# Patient Record
Sex: FEMALE | Race: BLACK OR AFRICAN AMERICAN | NOT HISPANIC OR LATINO | Employment: PART TIME | URBAN - METROPOLITAN AREA
[De-identification: names, ages, dates, MRNs, and addresses within clinical notes are randomized per-mention and may not be internally consistent; named-entity substitution may affect disease eponyms.]

---

## 2023-04-24 LAB
CHLAMYDIA TRACH., AMPLIFIED: NEGATIVE
CLUE CELLS: NORMAL
N. GONORRHEA, AMPLIFIED: NEGATIVE
NUGENT SCORE: 1
TRICHOMONAS VAGINALIS: NEGATIVE
YEAST: NORMAL

## 2023-09-29 LAB
CLUE CELLS: NORMAL
NUGENT SCORE: NORMAL
YEAST: NORMAL

## 2023-09-30 LAB
CHLAMYDIA TRACH., AMPLIFIED: NEGATIVE
N. GONORRHEA, AMPLIFIED: NEGATIVE
TRICHOMONAS VAGINALIS: NEGATIVE

## 2023-10-03 ENCOUNTER — TELEPHONE (OUTPATIENT)
Dept: OBSTETRICS AND GYNECOLOGY | Facility: CLINIC | Age: 23
End: 2023-10-03
Payer: COMMERCIAL

## 2023-10-03 NOTE — TELEPHONE ENCOUNTER
Late entry for 10/2/2023  [facility] lab cancelled, not received by lab  Left message to call RN.    10/4/23  No return call from patient  Secure chat to FREDI HUTCHINSON

## 2023-12-12 ENCOUNTER — HOSPITAL ENCOUNTER (EMERGENCY)
Facility: HOSPITAL | Age: 23
Discharge: HOME | End: 2023-12-12
Payer: COMMERCIAL

## 2023-12-12 VITALS
TEMPERATURE: 97.7 F | HEIGHT: 65 IN | BODY MASS INDEX: 43.32 KG/M2 | DIASTOLIC BLOOD PRESSURE: 86 MMHG | SYSTOLIC BLOOD PRESSURE: 133 MMHG | HEART RATE: 96 BPM | RESPIRATION RATE: 16 BRPM | OXYGEN SATURATION: 98 % | WEIGHT: 260 LBS

## 2023-12-12 DIAGNOSIS — J06.9 VIRAL URI WITH COUGH: Primary | ICD-10-CM

## 2023-12-12 LAB
FLUAV RNA RESP QL NAA+PROBE: NOT DETECTED
FLUBV RNA RESP QL NAA+PROBE: NOT DETECTED
SARS-COV-2 RNA RESP QL NAA+PROBE: NOT DETECTED

## 2023-12-12 PROCEDURE — 99283 EMERGENCY DEPT VISIT LOW MDM: CPT

## 2023-12-12 PROCEDURE — 99284 EMERGENCY DEPT VISIT MOD MDM: CPT | Performed by: PHYSICIAN ASSISTANT

## 2023-12-12 PROCEDURE — 2500000001 HC RX 250 WO HCPCS SELF ADMINISTERED DRUGS (ALT 637 FOR MEDICARE OP): Performed by: PHYSICIAN ASSISTANT

## 2023-12-12 PROCEDURE — 87636 SARSCOV2 & INF A&B AMP PRB: CPT | Performed by: PHYSICIAN ASSISTANT

## 2023-12-12 RX ORDER — BENZONATATE 100 MG/1
200 CAPSULE ORAL ONCE
Status: COMPLETED | OUTPATIENT
Start: 2023-12-12 | End: 2023-12-12

## 2023-12-12 RX ORDER — IBUPROFEN 600 MG/1
600 TABLET ORAL EVERY 6 HOURS PRN
Qty: 30 TABLET | Refills: 0 | Status: SHIPPED | OUTPATIENT
Start: 2023-12-12

## 2023-12-12 RX ORDER — ACETAMINOPHEN 325 MG/1
650 TABLET ORAL EVERY 6 HOURS PRN
Qty: 30 TABLET | Refills: 0 | Status: SHIPPED | OUTPATIENT
Start: 2023-12-12

## 2023-12-12 RX ORDER — GUAIFENESIN 600 MG/1
1200 TABLET, EXTENDED RELEASE ORAL 2 TIMES DAILY PRN
Qty: 20 TABLET | Refills: 0 | Status: SHIPPED | OUTPATIENT
Start: 2023-12-12 | End: 2024-02-16 | Stop reason: WASHOUT

## 2023-12-12 RX ORDER — BENZONATATE 100 MG/1
200 CAPSULE ORAL 3 TIMES DAILY PRN
Qty: 20 CAPSULE | Refills: 0 | Status: SHIPPED | OUTPATIENT
Start: 2023-12-12 | End: 2024-02-16 | Stop reason: WASHOUT

## 2023-12-12 RX ORDER — ACETAMINOPHEN 325 MG/1
975 TABLET ORAL ONCE
Status: COMPLETED | OUTPATIENT
Start: 2023-12-12 | End: 2023-12-12

## 2023-12-12 RX ORDER — GUAIFENESIN 100 MG/5ML
400 SOLUTION ORAL ONCE
Status: COMPLETED | OUTPATIENT
Start: 2023-12-12 | End: 2023-12-12

## 2023-12-12 RX ORDER — IBUPROFEN 600 MG/1
600 TABLET ORAL ONCE
Status: COMPLETED | OUTPATIENT
Start: 2023-12-12 | End: 2023-12-12

## 2023-12-12 RX ADMIN — ACETAMINOPHEN 975 MG: 325 TABLET ORAL at 07:28

## 2023-12-12 RX ADMIN — GUAIFENESIN 400 MG: 200 SOLUTION ORAL at 07:28

## 2023-12-12 RX ADMIN — IBUPROFEN 600 MG: 600 TABLET, FILM COATED ORAL at 07:28

## 2023-12-12 RX ADMIN — BENZONATATE 200 MG: 100 CAPSULE ORAL at 07:28

## 2023-12-12 ASSESSMENT — COLUMBIA-SUICIDE SEVERITY RATING SCALE - C-SSRS
1. IN THE PAST MONTH, HAVE YOU WISHED YOU WERE DEAD OR WISHED YOU COULD GO TO SLEEP AND NOT WAKE UP?: NO
2. HAVE YOU ACTUALLY HAD ANY THOUGHTS OF KILLING YOURSELF?: NO
6. HAVE YOU EVER DONE ANYTHING, STARTED TO DO ANYTHING, OR PREPARED TO DO ANYTHING TO END YOUR LIFE?: NO

## 2023-12-12 ASSESSMENT — PAIN - FUNCTIONAL ASSESSMENT: PAIN_FUNCTIONAL_ASSESSMENT: 0-10

## 2023-12-12 ASSESSMENT — PAIN SCALES - GENERAL: PAINLEVEL_OUTOF10: 4

## 2023-12-12 NOTE — ED TRIAGE NOTES
Patient presents to the ED for flu-like symptoms. Patient is endorsing lower back pain, N/V, fevers, and chills  x1 week.  Denies any chest pain

## 2023-12-12 NOTE — DISCHARGE INSTRUCTIONS
You can see the results of your COVID-19 and influenza testing on the [facility] keila.  Take the medications as well as other over-the-counter remedies to help your symptoms.  It is important to get plenty of rest and stay well-hydrated.  If you have persistent symptoms follow-up with your primary care doctor, if you need a new 1 call the number listed below.

## 2023-12-12 NOTE — ED PROVIDER NOTES
HPI:  23-year-old female with history of asthma presents complaining of flulike symptoms.  States that for about 5 days now she has had malaise, myalgias, rhinorrhea, coryza, cough.  States she has tried some over-the-counter remedies like vapor rubs in the leg however has not had adequate relief.  She denies any chest pain or shortness of breath.  Denies any sore throat.  States she had 1 episode of nonbloody nonbilious vomiting on Thursday however has been able to eat since then.  She denies any urinary complaints.  Denies any concerns for pregnancy or STDs.      Physical Exam:   GEN: Vitals noted. NAD  EYES:  EOMs grossly intact, anicteric sclera  VERITO: Mucosa moist.  No remarkable tonsillar swelling or exudate  Face: No frontal or maxillary tenderness to percussion  NECK: Supple.  CARD: RRR  PULMONARY: Moving air well. Clear all lung fields.  ABDOMEN: Soft, no guarding, no rigidity. Nontender. NABS  EXTREMITIES: Full ROM, no pitting edema,   SKIN: Intact, warm and dry  NEURO: Alert and oriented x 3, speech is clear, no obvious deficits noted.       ----------------------------------------------------------------------------------------------------------------------------    MDM:  23-year-old female presenting for URI symptoms x 5 days.  On exam she is relatively well-appearing able to complete.  Vital signs stable.  Lungs CTABL.  No sinus tenderness given her constellation of symptoms with her physical exam high likelihood for viral URI including COVID-19 and influenza.  Given lack of hypoxia well-appearing and benign exam suspect she will have mild course.  She was given prescriptions for symptomatic relief.  Follow-up with primary care for persistent symptoms.  Work note was provided.  Return precautions reviewed.    No orders to display     Labs Reviewed   INFLUENZA A AND B PCR   SARS-COV-2 PCR, SYMPTOMATIC     Diagnoses as of 12/12/23 0716   Viral URI with cough      ----------------------------------------------------------------------------------------------------------------------------    This note was dictated using a speech recognition program.  While an attempt was made at proof reading to minimize errors, minor errors in transcription may be present call for questions.     Jamir Williamson PA-C  12/12/23 0746

## 2023-12-12 NOTE — Clinical Note
Luz Elena Morris was seen and treated in our emergency department on 12/12/2023.  She may return to work on 12/16/2023.       If you have any questions or concerns, please don't hesitate to call.      Jamir Williamson PA-C

## 2024-02-16 ENCOUNTER — OFFICE VISIT (OUTPATIENT)
Dept: PRIMARY CARE | Facility: CLINIC | Age: 24
End: 2024-02-16
Payer: COMMERCIAL

## 2024-02-16 VITALS
BODY MASS INDEX: 46.96 KG/M2 | RESPIRATION RATE: 16 BRPM | SYSTOLIC BLOOD PRESSURE: 126 MMHG | HEART RATE: 99 BPM | HEIGHT: 66 IN | DIASTOLIC BLOOD PRESSURE: 80 MMHG | OXYGEN SATURATION: 97 % | WEIGHT: 292.2 LBS

## 2024-02-16 DIAGNOSIS — Z00.00 ANNUAL PHYSICAL EXAM: Primary | ICD-10-CM

## 2024-02-16 DIAGNOSIS — M54.50 ACUTE LOW BACK PAIN, UNSPECIFIED BACK PAIN LATERALITY, UNSPECIFIED WHETHER SCIATICA PRESENT: ICD-10-CM

## 2024-02-16 DIAGNOSIS — R53.83 FATIGUE, UNSPECIFIED TYPE: ICD-10-CM

## 2024-02-16 DIAGNOSIS — H53.2 DIPLOPIA: ICD-10-CM

## 2024-02-16 DIAGNOSIS — E66.01 SEVERE OBESITY (BMI >= 40) (MULTI): ICD-10-CM

## 2024-02-16 DIAGNOSIS — J45.22 MILD INTERMITTENT ASTHMA WITH STATUS ASTHMATICUS (HHS-HCC): ICD-10-CM

## 2024-02-16 DIAGNOSIS — M79.673 PAIN OF FOOT, UNSPECIFIED LATERALITY: ICD-10-CM

## 2024-02-16 DIAGNOSIS — Z11.59 SCREENING FOR VIRAL DISEASE: ICD-10-CM

## 2024-02-16 DIAGNOSIS — K62.5 BRBPR (BRIGHT RED BLOOD PER RECTUM): ICD-10-CM

## 2024-02-16 PROCEDURE — 99213 OFFICE O/P EST LOW 20 MIN: CPT | Performed by: SPECIALIST

## 2024-02-16 PROCEDURE — 1036F TOBACCO NON-USER: CPT | Performed by: SPECIALIST

## 2024-02-16 PROCEDURE — 99385 PREV VISIT NEW AGE 18-39: CPT | Performed by: SPECIALIST

## 2024-02-16 RX ORDER — MUPIROCIN 20 MG/G
OINTMENT TOPICAL 2 TIMES DAILY
COMMUNITY

## 2024-02-16 RX ORDER — DESONIDE 0.5 MG/G
OINTMENT TOPICAL 2 TIMES DAILY
COMMUNITY
Start: 2020-08-12 | End: 2024-02-16

## 2024-02-16 RX ORDER — ALBUTEROL SULFATE 90 UG/1
AEROSOL, METERED RESPIRATORY (INHALATION)
COMMUNITY
Start: 2015-06-24

## 2024-02-16 RX ORDER — CLINDAMYCIN PHOSPHATE 10 UG/ML
LOTION TOPICAL 2 TIMES DAILY
COMMUNITY

## 2024-02-16 RX ORDER — ERYTHROMYCIN AND BENZOYL PEROXIDE 30; 50 MG/G; MG/G
GEL TOPICAL
COMMUNITY
Start: 2016-06-29 | End: 2024-02-16 | Stop reason: WASHOUT

## 2024-02-16 RX ORDER — CETIRIZINE HYDROCHLORIDE 10 MG/1
10 TABLET ORAL DAILY PRN
COMMUNITY

## 2024-02-16 RX ORDER — BENZOYL PEROXIDE 50 MG/ML
LIQUID TOPICAL EVERY 12 HOURS
COMMUNITY
Start: 2023-01-27

## 2024-02-16 RX ORDER — FLUTICASONE PROPIONATE 50 MCG
1 SPRAY, SUSPENSION (ML) NASAL DAILY
COMMUNITY
Start: 2021-08-04

## 2024-02-16 NOTE — PROGRESS NOTES
Subjective   Patient ID: Luz Elena Morris is a 23 y.o. female who presents for Establish Care, Annual Exam, and Back Pain.  HPI    22 yo female Pmhx sig for Allergic Rhinitis, Intermittent Asthma, PCOS and back pain presents as new patient for physical exam and back pain    Sees gynecology  Has not needed albuterol  Using mucpirocon for thigh acne    Got injured work at [employer] was lifting heavy and feels she pulled a sciatic nerve, went to urgent care, got stuff in mail   Back pain off/on since December, feels the pain from lumbar to upper buttock and shoots down back and side of right leg stops at knee.  The shooting pain from her back went to her leg and was a wincing pain.  Able to weight bear, pain comes/goes.  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  When she pulls a bag of 30-50 pound from an overhead rack to ground and when leans to right side to drag bag on floor  she feels pain in back and can be also in leg. Also having ankle.    Stopped OCPs for 7 months, not active now, stopped because of weighjt gain and periods are regular so stopped    Wants to see dietician.  Weight past couple of years 292 usually 220 to 253    Also yusef hair and thinks she is getting CTS, discussed, will need to address at next visit  Diplopia occasionally side by side, discussed eye exam and schedule follow-up    No Known Allergies   Current Outpatient Medications   Medication Instructions    acetaminophen (TYLENOL) 650 mg, oral, Every 6 hours PRN    albuterol 90 mcg/actuation inhaler inhalation    benzoyl peroxide 5 % external wash Every 12 hours    cetirizine (ZYRTEC) 10 mg, oral, Daily PRN    clindamycin (Cleocin T) 1 % lotion 2 times daily    fluticasone (Flonase) 50 mcg/actuation nasal spray 1 spray, nasal, Daily    ibuprofen 600 mg, oral, Every 6 hours PRN    mupirocin (Bactroban) 2 % ointment Topical, 2 times daily, apply sparingly to affected area        Review of Systems  Constitutional  Positive fatigue, no  "fevers, no chills, no unintentional weight loss,   HEENT:  Positive life long daily headaches every other days, Morning headaches, snoring, no dizziness, no blurred vision (eye exam last yr) occasional double vision side by side, no hearing loss  Cardiovascular:  No chest pain, no palpitations, Positive shortness of breath with exertion (one flight of stairs)  Respiratory:  No cough, no hemoptysis, no wheezing, No shortness of breath at rest  GI:  No dysphagia, no odynophagia, no reflux, no abdominal pain, no nausea, no vomiting, no changes in bowel habits, occasional bright red blood per rectum, no melena, no incontinence  :  No urinary frequency, no dysuria, no urine incontinence  MSK:  Fell at work not injured, back joint pain, no joint swelling  Neuro:  No tremors, no extremity weakness, no changes in sensation    Physical Exam  /80   Pulse 99   Resp 16   Ht 1.676 m (5' 6\")   Wt 133 kg (292 lb 3.2 oz)   SpO2 97%   BMI 47.16 kg/m²   General:    Well-appearing  F in no acute distress, well nourished, well hydrated  Head:  Normocephalic, atraumatic  Skin:          Warm dry,   Eyes:  Anicteric sclera, pupils equal,   Ears:        TMs intact  Oral:      Not examined due to pandemic  Neck:   Supple, no cervical/supraclavicular adenopathy, no thyromegaly or nodules appreciated on exam  Cor:      Regular rate, normal S1, S2, no murmurs appreciated, no S3, no S4   Lungs:   Clear to auscultation b/l, no wheezes, no rhonchi, no crackles, no accessory respiratory muscle use  Abd:          Soft, nontender, no guarding, no rebound, no hepatosplenomegaly appreciated   Ext:            No lower extremity edema, no palpable cords  Pulses:      Pedal pulses intact  Neuro:   CN2-12 grossly intact   MSK:                No ttp over lumbar spinous processes, no ttp at Right SI but positive ttp at left SI joint, negative bilaterl SLR test    Assessment/Plan   Problem List Items Addressed This Visit       Mild intermittent " asthma with status asthmaticus     Has prn albuterol but has not needed         Annual physical exam - Primary     Recommed annual influenza vaccine  Prior Tdap 2015  Recommend covid vaccine  Previously received Gardasil vaccines  Continue annual gynecology exams (Last gynecology exam with midwife 9/2/2023 and recently to urgent care for bv)  BMI 47  Labs ordered CBC CMP TSH Free T4 HBA1C fx lipids Hep C Antibody         Relevant Orders    Comprehensive Metabolic Panel    CBC    Lipid Panel    Hemoglobin A1C    Thyroid Stimulating Hormone    Thyroxine, Free    Severe obesity (BMI >= 40) (CMS/[facility])     Motivated to work toward weight loss  Wants to see a dietician  Weight 292# past couple of years, but used to be 220-253#           Relevant Orders    Hemoglobin A1C    Referral to Adult Sleep Medicine    Referral to [facility]    Fatigue     Labs ordered to evaluate for anemia abnormal thyroid function  Has morning headaches, and snores, suspect ROXANA  Referred to Sleep Medicine and [facility]         Relevant Orders    CBC    Thyroid Stimulating Hormone    Thyroxine, Free    Referral to Adult Sleep Medicine    Referral to [facility]    Screening for viral disease    Relevant Orders    Hepatitis C antibody    Pain of foot    Relevant Orders    Referral to Podiatry    Acute low back pain     -Physical therapy  -has been taking occasional Ibuprofen, to take on full stomacjh once daily for 7 days  -Wants letter for work to not lift more than 25# for 30 days, prepared  -To be re-evaluated after PT  -If sx worsen, consider medrol dp         Relevant Orders    Referral to Physical Therapy    BRBPR (bright red blood per rectum)     Said occasional  Ordered CBC  If anemia, consider further evaluation based on blood work results         Diplopia     Reports occasional horizontal diplopia  Neuro exam nonfocal  Recommended ophthalmology evaluation   Schedule follow-up visit to address        Will need to  schedule follow-up to discuss concerns about CTS       Karey Mora, DO

## 2024-02-27 PROBLEM — M79.673 PAIN OF FOOT: Status: ACTIVE | Noted: 2024-02-27

## 2024-02-27 PROBLEM — H53.2 DIPLOPIA: Status: ACTIVE | Noted: 2024-02-27

## 2024-02-27 PROBLEM — M54.50 ACUTE LOW BACK PAIN: Status: ACTIVE | Noted: 2024-02-27

## 2024-02-27 PROBLEM — R53.83 FATIGUE: Status: ACTIVE | Noted: 2024-02-27

## 2024-02-27 PROBLEM — K62.5 BRBPR (BRIGHT RED BLOOD PER RECTUM): Status: ACTIVE | Noted: 2024-02-27

## 2024-02-27 PROBLEM — Z11.59 SCREENING FOR VIRAL DISEASE: Status: ACTIVE | Noted: 2024-02-27

## 2024-02-27 NOTE — ASSESSMENT & PLAN NOTE
Recommed annual influenza vaccine  Prior Tdap 2015  Recommend covid vaccine  Previously received Gardasil vaccines  Continue annual gynecology exams (Last gynecology exam with midwife 9/2/2023 and recently to urgent care for bv)  BMI 47  Labs ordered CBC CMP TSH Free T4 HBA1C fx lipids Hep C Antibody

## 2024-02-27 NOTE — ASSESSMENT & PLAN NOTE
Reports occasional horizontal diplopia  Neuro exam nonfocal  Recommended ophthalmology evaluation   Schedule follow-up visit to address

## 2024-02-27 NOTE — ASSESSMENT & PLAN NOTE
Labs ordered to evaluate for anemia abnormal thyroid function  Has morning headaches, and snores, suspect ROXANA  Referred to Sleep Medicine and [facility]

## 2024-03-07 ENCOUNTER — TELEPHONE (OUTPATIENT)
Dept: PRIMARY CARE | Facility: CLINIC | Age: 24
End: 2024-03-07

## 2024-03-07 NOTE — TELEPHONE ENCOUNTER
Patient would like a letter for her to take leave from now until she after she starts PT on 3/14 she would like to return on 3/19. Since she does not work on 3/18.

## 2024-03-14 PROBLEM — M79.604 LOW BACK PAIN RADIATING TO RIGHT LOWER EXTREMITY: Status: ACTIVE | Noted: 2024-03-14

## 2024-03-14 PROBLEM — M54.50 LOW BACK PAIN RADIATING TO RIGHT LOWER EXTREMITY: Status: ACTIVE | Noted: 2024-03-14

## 2024-03-20 ENCOUNTER — DOCUMENTATION (OUTPATIENT)
Dept: PHYSICAL THERAPY | Facility: HOSPITAL | Age: 24
End: 2024-03-20
Payer: COMMERCIAL

## 2024-03-20 NOTE — PROGRESS NOTES
Physical Therapy                 Therapy Communication Note    Patient Name: Luz Elena Morris  MRN: [id number]  Today's Date: 3/20/2024     Discipline: Physical Therapy    Missed Time: No Show    Comment: Pt no showed to PT appt on 3/20/24 at 5:30 PM

## 2024-04-02 ENCOUNTER — APPOINTMENT (OUTPATIENT)
Dept: SLEEP MEDICINE | Facility: HOSPITAL | Age: 24
End: 2024-04-02
Payer: COMMERCIAL

## 2024-04-09 ENCOUNTER — OFFICE VISIT (OUTPATIENT)
Dept: SLEEP MEDICINE | Facility: HOSPITAL | Age: 24
End: 2024-04-09
Payer: COMMERCIAL

## 2024-04-09 VITALS
WEIGHT: 293 LBS | SYSTOLIC BLOOD PRESSURE: 124 MMHG | DIASTOLIC BLOOD PRESSURE: 83 MMHG | OXYGEN SATURATION: 96 % | TEMPERATURE: 98 F | BODY MASS INDEX: 49.39 KG/M2

## 2024-04-09 DIAGNOSIS — R29.818 SUSPECTED SLEEP APNEA: Primary | ICD-10-CM

## 2024-04-09 DIAGNOSIS — E66.01 SEVERE OBESITY (BMI >= 40) (MULTI): ICD-10-CM

## 2024-04-09 DIAGNOSIS — R53.83 FATIGUE, UNSPECIFIED TYPE: ICD-10-CM

## 2024-04-09 PROCEDURE — 1036F TOBACCO NON-USER: CPT | Performed by: INTERNAL MEDICINE

## 2024-04-09 PROCEDURE — 99204 OFFICE O/P NEW MOD 45 MIN: CPT | Performed by: INTERNAL MEDICINE

## 2024-04-09 PROCEDURE — 99214 OFFICE O/P EST MOD 30 MIN: CPT | Mod: GC | Performed by: INTERNAL MEDICINE

## 2024-04-09 SDOH — ECONOMIC STABILITY: FOOD INSECURITY: WITHIN THE PAST 12 MONTHS, THE FOOD YOU BOUGHT JUST DIDN'T LAST AND YOU DIDN'T HAVE MONEY TO GET MORE.: NEVER TRUE

## 2024-04-09 SDOH — ECONOMIC STABILITY: FOOD INSECURITY: WITHIN THE PAST 12 MONTHS, YOU WORRIED THAT YOUR FOOD WOULD RUN OUT BEFORE YOU GOT MONEY TO BUY MORE.: NEVER TRUE

## 2024-04-09 ASSESSMENT — PATIENT HEALTH QUESTIONNAIRE - PHQ9
SUM OF ALL RESPONSES TO PHQ9 QUESTIONS 1 & 2: 2
1. LITTLE INTEREST OR PLEASURE IN DOING THINGS: SEVERAL DAYS
10. IF YOU CHECKED OFF ANY PROBLEMS, HOW DIFFICULT HAVE THESE PROBLEMS MADE IT FOR YOU TO DO YOUR WORK, TAKE CARE OF THINGS AT HOME, OR GET ALONG WITH OTHER PEOPLE: SOMEWHAT DIFFICULT
2. FEELING DOWN, DEPRESSED OR HOPELESS: SEVERAL DAYS

## 2024-04-09 ASSESSMENT — LIFESTYLE VARIABLES
SKIP TO QUESTIONS 9-10: 0
AUDIT-C TOTAL SCORE: 2
HOW OFTEN DO YOU HAVE SIX OR MORE DRINKS ON ONE OCCASION: LESS THAN MONTHLY
HOW MANY STANDARD DRINKS CONTAINING ALCOHOL DO YOU HAVE ON A TYPICAL DAY: 1 OR 2
HOW OFTEN DO YOU HAVE A DRINK CONTAINING ALCOHOL: MONTHLY OR LESS

## 2024-04-09 ASSESSMENT — PAIN SCALES - GENERAL: PAINLEVEL: 0-NO PAIN

## 2024-04-09 NOTE — PROGRESS NOTES
Patient: Luz Elena Morris    [id number]  : 2000 -- AGE 23 y.o.    Provider: Treasure Martinez MD     Location [facility]   Service Date: 2024            [facility]  New Visit Note    HISTORY OF PRESENT ILLNESS     The patient's referring provider is: Karey Mora DO;     HISTORY OF PRESENT ILLNESS   Luz Elena Morris is a 23 y.o. female with a past medical history significant for obesity who presents to a [facility] for a comprehensive sleep medicine evaluation. She is here taking care of her own health. Thinks she had a sleep study when she was a kid but isnt' sure what happened. She can sleep for 8 hours and doesn't feel rested. She falls asleep at weird times- can be during a conversation, fell asleep in the bathroom at work. She has never fallen asleep behind the wheel. Missed her appointment lst week because she overslept. Wasn't as sleepy when she was in high school    She reports 40-60# weight gain since quitting smoking.   Reports a lot of depression, sadness. Due to weight gain    Sleep History:  Patient typically goes to bed around 8-9pm on the weekdays and on the weekends. It takes a few minutes to fall asleep.   Patient wakes up 0-1 times at night. Awakenings are due to nocturia and last a few minutes.  Patient wakes up feeling unrefreshed. Typically takes naps daily    Patient reports excessive daytime sleepiness for last few years, loud snoring,  witnessed apneic episodes by bed partner, denies waking up choking/gasping, reports dry mouth and morning headaches.     Preferred sleeping position: SLEEP POSITION: prone    Patient denies history of RLS, cataplexy, nightmares, hypnagogic or hypnopompic hallucinations or parasomnias. They do not act out their dreams.     Past Sleep History  Patient has the following sleep-related diagnoses: non  No previous PSG      ESS: 15    REVIEW OF SYSTEMS     REVIEW OF  SYSTEMS: as above      ALLERGIES AND MEDICATIONS     ALLERGIES  No Known Allergies    MEDICATIONS  Current Outpatient Medications   Medication Sig Dispense Refill    albuterol 90 mcg/actuation inhaler Inhale.      benzoyl peroxide 5 % external wash every 12 hours.      cetirizine (ZyrTEC) 10 mg tablet Take 1 tablet (10 mg) by mouth once daily as needed.      clindamycin (Cleocin T) 1 % lotion 2 times a day.      fluticasone (Flonase) 50 mcg/actuation nasal spray Administer 1 spray into affected nostril(s) once daily.      ibuprofen 600 mg tablet Take 1 tablet (600 mg) by mouth every 6 hours if needed for mild pain (1 - 3) or fever (temp greater than 38.0 C). 30 tablet 0    mupirocin (Bactroban) 2 % ointment Apply topically 2 times a day. apply sparingly to affected area      acetaminophen (TylenoL) 325 mg tablet Take 2 tablets (650 mg) by mouth every 6 hours if needed for mild pain (1 - 3) or fever (temp greater than 38.0 C). (Patient not taking: Reported on 2024) 30 tablet 0     No current facility-administered medications for this visit.         PAST HISTORY     PAST MEDICAL HISTORY  She  has a past medical history of Dysmenorrhea, unspecified (08/15/2022), Encounter for immunization (09/15/2020), Encounter for immunization (2017), Encounter for issue of repeat prescription (08/15/2022), and Encounter for screening for disorder due to exposure to contaminants.    PAST SURGICAL HISTORY:  No past surgical history on file.    FAMILY HISTORY  Family History   Problem Relation Name Age of Onset    Diabetes Mother              Stroke Mother      Other (amputation) Mother         She does have a family history of sleep disorder. - mother    SOCIAL HISTORY  She  reports that she has quit smoking. Her smoking use included pipe. She has never used smokeless tobacco. She reports current alcohol use of about 2.0 standard drinks of alcohol per week. She reports that she does not currently use drugs after  "having used the following drugs: Marijuana. She currently lives with family.    Caffeine consumption: rare  Alcohol consumption: no  Smoking: no  Marijuana: no      PHYSICAL EXAM     VITAL SIGNS: /83 (BP Location: Left arm, Patient Position: Sitting)   Temp 36.7 °C (98 °F) (Temporal)   Wt 139 kg (306 lb)   SpO2 96% Comment: RA  BMI 49.39 kg/m²      CURRENT WEIGHT:   Vitals:    04/09/24 1346   Weight: 139 kg (306 lb)     Body mass index is 49.39 kg/m².  PREVIOUS WEIGHTS:  Wt Readings from Last 3 Encounters:   04/09/24 139 kg (306 lb)   02/16/24 133 kg (292 lb 3.2 oz)   12/12/23 118 kg (260 lb)       Physical Exam:  General: Alert, oriented, conversant.  HEENT: Lateral facial profile with mild retrognathia, nasal septum midline, turbinates nonboggy, oropharynx is Mallampati class 4, tongue large with scalloping, jaw occlusion class 1, dentition ok.  Heart: RRR, no murmur  Lungs: CTAB, no wheezing  Extremities: no peripheral edema  Neurologic: Language is normal and fluent, face symmetric, tongue protrusion midline, stance and gait normal.   Psychiatric: Affect appropriate, mood ok.    RESULTS/DATA     No results found for: \"CO2\", \"IRON\", \"TRANSFERRIN\", \"IRONSAT\", \"TIBC\", \"FERRITIN\"          ASSESSMENT/PLAN     Ms. Morris is a 23 y.o. female and She was referred to the [facility] for evaluation of sleep disordered breathing.     #Suspected sleep apnea:  - high suspicion of apnea  - will order a HSAT  - we discussed sleep hygiene including regular sleep/wake times, avoidance of technology in bed, a regular bedtime routine, and avoiding daytime naps.  - Patient was advised not to drive or operate heavy machinery when sleepy.    #Obesity:  - BMI is 49 today  - discussed starting an exercise plan- she is going to try to dance (which she loves) 2 times a week  - will refer for weight management     Follow up 3 months      Thank you for involving Sleep Medicine in this patient's " care. It was a pleasure to see Luz Elena Morris today. We will plan to follow up in 3 months.    The patient was seen and discussed with attending Dr. Altamirano at the time of the encounter.  Treasure Martinez MD  Sleep Medicine Fellow      Attending Addendum:   NPV. BMI 49. ROXANA highly likely. Will proceed with HSAT via [facility]. Will also refer for bariatric assessment. She is agreeable to it. RTC 4 months. She verbalized understanding.     Drake Altamirano MD, FCCP, [facility]  Staff Physician  Division of Pulmonary, Critical Care, and Sleep Medicine  [facility]  Clinical Associate Professor of Medicine  [facility]

## 2024-04-09 NOTE — PATIENT INSTRUCTIONS
[facility] Sleep Medicine  [facility]  [address]  [facility] [address]  [phone]       NAME: Luz Elena Morris   DATE: 4/9/2024     Your Sleep Provider Today: Drake Altamirano MD  Your Primary Care Physician: No primary care provider on file.   Your Referring Provider: Karey Mora DO    DIAGNOSIS:   1. Suspected sleep apnea        2. Fatigue, unspecified type  Referral to Adult Sleep Medicine      3. Severe obesity (BMI >= 40) (CMS/[facility])  Referral to Adult Sleep Medicine          Thank you for coming to the Sleep Medicine Clinic today! Your sleep medicine provider today was: Drake Altamirano MD Below is a summary of your treatment plan, other important information, and our contact numbers:      TREATMENT PLAN     Please get your sleep study done, we will call with the results  We placed a referral to weight management    Referrals:  We are referring you to: weight management    Follow-up Appointment:  3 months      IMPORTANT INFORMATION     Call 911 for medical emergencies.  Our offices are generally open from Monday-Friday, 9 am - 5 pm.  If you need to get in touch with me, you may either call me and my team(number is below) or you can use rVue.  If a referral for a test, for CPAP, or for another specialist was made, and you have not heard about scheduling this within a week, please call scheduling at [phone]).  If you are unable to make your appointment for clinic or an overnight study, kindly call the office at least 48 hours in advance to cancel and reschedule.  If you are on CPAP, please bring your device's card or the device to each clinic appointment.   There are no supporting services by either the sleep doctors or their staff on weekends and Holidays, or after 5 PM on weekdays.   If you have been asked to come to a sleep study, make sure you bring toiletries, a comfy pillow, and any nighttime medications that you  may regularly take. Also be sure to eat dinner before you arrive. We generally do not provide meals.      PRESCRIPTIONS     We require 7 days advanced notice for prescription refills. If we do not receive the request in this time, we cannot guarantee that your medication will be refilled in time.      IMPORTANT PHONE NUMBERS     Sleep Medicine Clinic Fax: [phone]  Appointments (for Pediatric Sleep Clinic): [phone] - option 1  Appointments (for Adult Sleep Clinic): [phone] - option 2  Appointments (For Sleep Studies): [phone] - option 3  Behavioral Sleep Medicine: [phone]  Sleep Surgery: [phone]  ENT (Otolaryngology): [phone]  Headache Clinic (Neurology): [phone]  Neurology: [phone]  Psychiatry: [phone]  Pulmonary Function Testing (PFT) Center: [phone]  Pulmonary Medicine: [phone]  [facility] (DME): [phone]  [facility] (DME): [phone]  [facility] (DME): [phone]      OUR ADULT SLEEP MEDICINE TEAM   Please do not hesitate to call the office or sleep nurse with any questions between appointments:    Adult Sleep Nurses (Chanel Maria, RN and Rosalina Pathak RN):  For clinical questions and refilling prescriptions: [phone]  Email sleep diaries and other documents at: [email or web address]    Adult Sleep Medicine Secretaries:  Lurdes James (For [place]/Healy/Krise/[place]/Yeh/Gtz):   P: [phone]  F: [phone]  Gemma Champion (For [place]): P: [phone]  Fax: [phone]  Italia Gale (For Jurcevic/Blank): P: [phone]  F: [phone]  Marley Perkins (For [place]): P: [phone]  F: [phone]  Viki Arzate (For Teresa/Ghulam/Zakhary): P: [phone]  F: [phone]  Camilla Botello (For Jagdeep/Scotty): P: [phone]  F: [phone]     Adult Sleep Medicine Advanced Practice Providers:  Prakash Murray ([place], [place])  Bhavani Parmar (.  "[facility])  Magda Randle CNP ([place], [place], [place])  Tiffany Tamez CNP ([place], [place], [place])  Venus Santiago ([place], [place], [place])  Eliseo Fajardo CNP ([place], [place])        OUR SLEEP TESTING LOCATIONS     Our team will contact you to schedule your sleep study, however, you can contact us as follow:  Main Phone Line (scheduling only): [phone]), option 3  Adult and Pediatric Locations   [place] (6 years and older): [facility] - 4th floor ([address]) After hours line: [phone]  [facility] (Main campus: All ages): [facility], 6th floor. After hours line: [phone]   [place] (5 years and older; younger considered on case-by-case basis): [address], [address]. Scheduling  After hours line: [phone]   [place] (6 years and older): [address]; [address]   [place] (6 years and older): [address], [address]  After hours line: [phone]   [place] (13 years and older) in [place]: [address], 2nd floor  After hours line: [phone]  [facility] (13 year and older): [address]  After hours line: [phone]     Adult Only Locations:   [place] (18 years and older): [address] [facility] [address] [facility] [address] [facility], 2nd floor   [place] (18 years and older): [address]; 4th floor  After hours line: [phone]  [facility] (18 years and older) at [place]: [address] [facility] [facility] [address]  After hours line: [phone]          CONTACTING YOUR SLEEP MEDICINE PROVIDER     Send a message directly to your provider through \"My Chart\", which is the email service through your  Records Account: [email or web address]   Call [phone] and leave a message. One of the administrative assistants will forward the message to your sleep medicine provider through \"My Chart\" and/or email.     Your " sleep medicine provider for this visit was: Draek Altamirano MD

## 2024-04-15 ENCOUNTER — APPOINTMENT (OUTPATIENT)
Dept: SLEEP MEDICINE | Facility: HOSPITAL | Age: 24
End: 2024-04-15
Payer: COMMERCIAL

## 2024-04-16 DIAGNOSIS — G47.33 OSA (OBSTRUCTIVE SLEEP APNEA): Primary | ICD-10-CM

## 2024-05-09 ENCOUNTER — TREATMENT (OUTPATIENT)
Dept: PHYSICAL THERAPY | Facility: HOSPITAL | Age: 24
End: 2024-05-09
Payer: COMMERCIAL

## 2024-05-09 DIAGNOSIS — M54.50 LOW BACK PAIN RADIATING TO RIGHT LOWER EXTREMITY: ICD-10-CM

## 2024-05-09 DIAGNOSIS — M79.604 LOW BACK PAIN RADIATING TO RIGHT LOWER EXTREMITY: ICD-10-CM

## 2024-05-09 PROCEDURE — 97535 SELF CARE MNGMENT TRAINING: CPT | Mod: GP

## 2024-05-09 PROCEDURE — 97110 THERAPEUTIC EXERCISES: CPT | Mod: GP

## 2024-05-09 NOTE — PROGRESS NOTES
"Physical Therapy Treatment    Patient Name:Luz Elena Morris  MRN:[id number]  Today's Date:2024  Referred by: Karey Mora  Time Calculation  Start Time: 1517  Stop Time: 1600  Time Calculation (min): 43 min    Reason for Visit: LBP    Insurance:  Visit #: 2  Visits Approved: 20 visits   Authorization needed: Yes  Insurance info: [facility] [place] [place],    Therapy Diagnosis  1. Low back pain radiating to right lower extremity         Assessment:  Pt tolerated session well. A lot of time was taken for discussion this session about how pt has been since initial eval, current POC, current tx options, and extensively education (see below) on self care and management of sx. It was decided that pt will be discharged to continue with current POC with chiropractor for workman's comp. HEP was updated and focused on a lot of education for proper body mechanics, nutrition, and activity modification. All pt's questions were answered prior to end of session. Pt will seek out additional PT services if needed following current tx POC.    Plan:  Discharge and pt will continue with chiropractor, seek out additional services PRN.    Precautions:  Fall Risk: None     Subjective:  Pt states that she has been seeing a chiropractor for her workman's comp purposes that has really made her back better. She would like to continue there and is feeling better.     Pain:   Ratin/10    HEP Compliance: Yes     Objective:  Diaphragm dominant abdominal contraction in HL  Interventions:  PT Therapeutic Procedures Time Entry  Therapeutic Exercise Time Entry: 23  Self-Care/Home Mgmt Trainin    Therapeutic Exercise: 23 min   Lumbar roll out with SB with rotation x 10 x 2  TG L5 squats x 10 x 2   HL TA 5\" x 10   HL TA BKFO 5\" x 10 R/L   HL TA alt marches 5\" x 10 R/L  Deadlift with 17.5# KB onto step x 10     Self care/Home management: 20 min  Pt educated intensively on nutrition, self care, overall wellness, proper body mechanics for " lifting at gym and at work, TA activation, proper technique for lifting, updated plan of care, staying active, and HEP updated.

## 2024-05-23 ENCOUNTER — DOCUMENTATION (OUTPATIENT)
Dept: SURGERY | Facility: HOSPITAL | Age: 24
End: 2024-05-23
Payer: COMMERCIAL

## 2024-07-09 ENCOUNTER — APPOINTMENT (OUTPATIENT)
Dept: SURGERY | Facility: HOSPITAL | Age: 24
End: 2024-07-09
Payer: COMMERCIAL

## 2024-08-05 ENCOUNTER — APPOINTMENT (OUTPATIENT)
Dept: SURGERY | Facility: HOSPITAL | Age: 24
End: 2024-08-05
Payer: COMMERCIAL

## 2024-09-11 ENCOUNTER — OFFICE VISIT (OUTPATIENT)
Dept: PRIMARY CARE | Facility: CLINIC | Age: 24
End: 2024-09-11
Payer: COMMERCIAL

## 2024-09-11 VITALS
WEIGHT: 293 LBS | SYSTOLIC BLOOD PRESSURE: 126 MMHG | HEIGHT: 66 IN | BODY MASS INDEX: 47.09 KG/M2 | TEMPERATURE: 96.7 F | OXYGEN SATURATION: 98 % | HEART RATE: 96 BPM | DIASTOLIC BLOOD PRESSURE: 80 MMHG | RESPIRATION RATE: 16 BRPM

## 2024-09-11 DIAGNOSIS — Z11.3 SCREENING EXAMINATION FOR STD (SEXUALLY TRANSMITTED DISEASE): ICD-10-CM

## 2024-09-11 DIAGNOSIS — B35.1 ONYCHOMYCOSIS: ICD-10-CM

## 2024-09-11 DIAGNOSIS — M54.50 LOW BACK PAIN AT MULTIPLE SITES: Primary | ICD-10-CM

## 2024-09-11 PROCEDURE — 1036F TOBACCO NON-USER: CPT | Performed by: FAMILY MEDICINE

## 2024-09-11 PROCEDURE — 99204 OFFICE O/P NEW MOD 45 MIN: CPT | Performed by: FAMILY MEDICINE

## 2024-09-11 PROCEDURE — 3008F BODY MASS INDEX DOCD: CPT | Performed by: FAMILY MEDICINE

## 2024-09-11 PROCEDURE — 99214 OFFICE O/P EST MOD 30 MIN: CPT | Performed by: FAMILY MEDICINE

## 2024-09-11 ASSESSMENT — ENCOUNTER SYMPTOMS
CARDIOVASCULAR NEGATIVE: 1
WHEEZING: 1
EYES NEGATIVE: 1
BACK PAIN: 1
GASTROINTESTINAL NEGATIVE: 1
FATIGUE: 1

## 2024-09-11 ASSESSMENT — PAIN SCALES - GENERAL: PAINLEVEL: 4

## 2024-09-11 NOTE — LETTER
To Whom It May Concern,    Luz Elena Morris ([id number]) is under my care. I recommend that she does not lift more than 25 lbs until further evaluation is completed.       Sincerely,    Melina Noe MD

## 2024-09-11 NOTE — PROGRESS NOTES
Subjective   Patient ID: Luz Elena Morris is a 24 y.o. who presents to [facility] care, and for a back injury and health screenings.  HPI    #Back injury  She injured her back working at [employer] in December. She initially felt a slight discomfort but was able to finish her shift.  The pain is worse when she is bending over and pulling weights. The pain improves with tylenol and ibuprofen. She occasionally has shooting pain down her legs. The pain is a 2/10 when sitting and a 6/10 when straining. She did work with PT which did help with her pain, but finished with them in June.    #Weight gain  She also reports gaining over 60 lbs in the last year since she stopped smoking marijuana. She has noticed feeling more tired and has been told that she stops breathing while sleeping and snores occasionally. She is interested in gastric sleeve surgery but needs to look into it more.    #Toenail concern  Over the last year her toenails have started curving inward and are painful when she cuts them. She is nervous since her mother passed away from diabetes and had issues with her toenails as well. She would like to be tested for diabetes today.    #STD concern  At the end of August she was told by her ex-boyfriend that he had chlamydia. She was given doxycycline and took most of the doses. She would like to have a full STD panel and pregnancy test today.     #History of hidradenitis  She reports a history of painful lesions under her armpits and in between her thighs which improved with benzoyl and mupirocin.    PMH  Past Medical History:   Diagnosis Date    Dysmenorrhea, unspecified 08/15/2022    Menstrual cramps    Encounter for immunization 09/15/2020    Immunization due    Encounter for immunization 01/11/2017    Immunization due    Encounter for issue of repeat prescription 08/15/2022    Medication refill    Encounter for screening for disorder due to exposure to contaminants     Screening for lead exposure     Asthma: last  used an inhaler in 2014  Seasonal allergies      Medications  Current Outpatient Medications on File Prior to Visit   Medication Sig Dispense Refill    acetaminophen (TylenoL) 325 mg tablet Take 2 tablets (650 mg) by mouth every 6 hours if needed for mild pain (1 - 3) or fever (temp greater than 38.0 C). 30 tablet 0    albuterol 90 mcg/actuation inhaler Inhale.      benzoyl peroxide 5 % external wash every 12 hours.      cetirizine (ZyrTEC) 10 mg tablet Take 1 tablet (10 mg) by mouth once daily as needed.      clindamycin (Cleocin T) 1 % lotion 2 times a day.      fluticasone (Flonase) 50 mcg/actuation nasal spray Administer 1 spray into affected nostril(s) once daily.      ibuprofen 600 mg tablet Take 1 tablet (600 mg) by mouth every 6 hours if needed for mild pain (1 - 3) or fever (temp greater than 38.0 C). 30 tablet 0    mupirocin (Bactroban) 2 % ointment Apply topically 2 times a day. apply sparingly to affected area       No current facility-administered medications on file prior to visit.       Surgical History  N/a    Social History  -tobacco use: currently smokes cigarettes, occasionally vapes  -alcohol use: 2 times per month, 1-2 drinks each time  -drug use: history of occasional other drug use, but not currently  She is currently on probation for drug use. She is scheduled to be done with probation in November if she pays a service fee. She lives with her father and feels safe at home. She is planning on going to school after her probation is done. 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    She is not currently sexually active. She was previously sexually active with men and women. She has a history of gonorrhea, chlamydia, trichomonas, and BV. She has never been pregnant.    She generally has a poor diet. She eats a lot of fried chicken and pork, and few fruits and vegetables. Her work is strenuous.    Family History  Family History   Problem Relation Name Age of Onset     "Diabetes Mother              Stroke Mother      Other (amputation) Mother       Grandmother has ovarian cancer      Review of Systems   Constitutional:  Positive for fatigue.   HENT: Negative.     Eyes: Negative.    Respiratory:  Positive for wheezing.    Cardiovascular: Negative.    Gastrointestinal: Negative.    Musculoskeletal:  Positive for back pain.       Objective     /80   Pulse 96   Temp 35.9 °C (96.7 °F)   Resp 16   Ht 1.676 m (5' 6\")   Wt 138 kg (304 lb)   LMP 2024   SpO2 98%   BMI 49.07 kg/m²     General: well appearing, no distress  Neck: No lymphadenopathy, no thyromegaly  CV: Regular rate and rhythm, no murmur  Lungs: Clear to auscultation bilaterally  Abdomen: Soft, nontender, nondistended  Extremities: No edema noted  Psych: Appropriate mood and affect  Back: Tender along lumbar spine and paraspinous muscles bilaterally. Straight leg raise negative   Feet: Bilateral onychomycosis noted     Assessment/Plan     Luz Elena Morris is a 24 y.o. who presents to establish care, and for a back injury and health screenings.    #Back pain  -Differential includes lumbar strain, herniated disk, stress fracture.  -Given chronic nature of pain and limited improvement with physical therapy, ordered an X-ray to assess injury  -Plan to start using a lidocaine patch to alleviate pain.  -Provided letter for work to avoid lifting > 25 pounds while we are further working up injury   -Placed referral to PT to further assess activity limitations.    #Onychomycosis  -Referral to podiatry    #STD testing  -Placed order for HIV, syphilis, trichomonas, gonorrhea, chlamydia  -Placed order for urine pregnancy test      Follow-up in 2 months for follow-up, plan to address weight gain and additional concerns at that time    Regine Richter, MS-3    Patient was seen and examined by myself in conjunction with medical student. I have edited note above. Georgi Noe      "

## 2024-09-11 NOTE — LETTER
October 16, 2024     To Whom It May Concern,     Luz Elena Morris (2000) is under my care. Her last visit was on 9/11/2024. I recommend that she does not lift more than 25 lbs until further evaluation is completed.         Sincerely,     Melina Noe MD

## 2024-09-20 NOTE — PROGRESS NOTES
Subjective     Date: 9/20/2024 Time: 9:27 AM  Name: Luz Elena Morris  MRN: [id number]    This is a 24 y.o. female with morbid obesity (Body mass index is 50.31 kg/m².) who presents to clinic for consideration of bariatric surgery. she has attempted and failed multiple diet and exercise regimens for weight loss. Initial Onset of obesity was in childhood.  Their goal for surgery is to  be healthier . The patient has tried multiple diets to lose weight including  intermittent fasting, portion control, vegan, water fast, ACV . The patient was most successful with the  intermittent fasting . The most pounds lost on this diet were 25 lbs. The patient considers their dietary weakness to be portion size The patient reports a  highest weight ever of 312 pounds and lowest weight ever of 210 pounds Distribution of Obesity: is central. Current diet:  watching portion size . Compliance: General Adherence Diet Problems:  portion size.  } Dietary Details Include:Dietary Details: 1-2 servings vegetables and protein daily.  The patient exercises 3-4 times /week  30 Minutes/Day Types of Exercise : walking    Comorbidities: sleep apnea not using an appliance   Patient Active Problem List   Diagnosis    Mild intermittent asthma with status asthmaticus ([facility])    Annual physical exam    Severe obesity (BMI >= 40) ([facility])    Fatigue    Screening for viral disease    Pain of foot    Acute low back pain    BRBPR (bright red blood per rectum)    Diplopia    Low back pain radiating to right lower extremity       SLEEVE Gastric Surgery For Morbid Obesity Laparoscopic Longitudinal Gastrectomy     0 = No symptoms    PMH:   Past Medical History:   Diagnosis Date    Dysmenorrhea, unspecified 08/15/2022    Menstrual cramps    Encounter for immunization 09/15/2020    Immunization due    Encounter for immunization 01/11/2017    Immunization due    Encounter for issue of repeat prescription 08/15/2022    Medication refill    Encounter for screening for  disorder due to exposure to contaminants     Screening for lead exposure        PSH: No past surgical history on file.       FAMILY HISTORY:  Family History   Problem Relation Name Age of Onset    Diabetes Mother              Stroke Mother      Other (amputation) Mother          SOCIAL HISTORY:  Social History     Tobacco Use    Smoking status: Former     Types: Pipe    Smokeless tobacco: Never   Vaping Use    Vaping status: Never Used   Substance Use Topics    Alcohol use: Yes     Alcohol/week: 2.0 standard drinks of alcohol     Types: 1 Glasses of wine, 1 Shots of liquor per week     Comment: socially    Drug use: Not Currently     Types: Marijuana       MEDICATIONS:  Prior to Admission Medications:  Medication Documentation Review Audit       Reviewed by Tonya Lagos CMA (Medical Assistant) on 24 at 1424      Medication Order Taking? Sig Documenting Provider Last Dose Status   acetaminophen (TylenoL) 325 mg tablet [id number] Yes Take 2 tablets (650 mg) by mouth every 6 hours if needed for mild pain (1 - 3) or fever (temp greater than 38.0 C). Jamir Williamson PA-C Taking Active   albuterol 90 mcg/actuation inhaler [id number] Yes Inhale. Historical Provider, MD Taking Active   benzoyl peroxide 5 % external wash [id number] Yes every 12 hours. Historical Provider, MD Taking Active   cetirizine (ZyrTEC) 10 mg tablet [id number] Yes Take 1 tablet (10 mg) by mouth once daily as needed. Historical Provider, MD Taking Active   clindamycin (Cleocin T) 1 % lotion [id number] Yes 2 times a day. Historical Provider, MD Taking Active   fluticasone (Flonase) 50 mcg/actuation nasal spray [id number] Yes Administer 1 spray into affected nostril(s) once daily. Historical Provider, MD Taking Active   ibuprofen 600 mg tablet [id number] Yes Take 1 tablet (600 mg) by mouth every 6 hours if needed for mild pain (1 - 3) or fever (temp greater than 38.0 C). Jamir Williamson PA-C Taking Active   mupirocin (Bactroban) 2 %  ointment [id number] Yes Apply topically 2 times a day. apply sparingly to affected area Historical Provider, MD Taking Active                     ALLERGIES:  No Known Allergies    REVIEW OF SYSTEMS:  GENERAL: Negative for malaise, significant weight loss and fever  HEAD: Negative for headache, swelling.  NECK: Negative for lumps, goiter, pain and significant neck swelling  RESPIRATORY: Negative for cough, wheezing or shortness of breath.  CARDIOVASCULAR: Negative for chest pain, leg swelling or palpitations.  GI: Negative for abdominal discomfort, blood in stools or black stools or change in bowel habits  : No history of dysuria, frequency or incontinence  MUSCULOSKELETAL: Negative for joint pain or swelling, back pain or muscle pain.  SKIN: Negative for lesions, rash, and itching.  PSYCH: Negative for sleep disturbance, mood disorder and recent psychosocial stressors.  ENDOCRINE: Negative for cold or heat intolerance, polyuria, polydipsia and goiter.    Objective   PHYSICAL EXAM:  Visit Vitals  LMP 08/13/2024   OB Status Having periods   Smoking Status Former     General appearance: obese, NAD  Neuro: AOx3  Head: EOMI; no swelling or lesions of scalp or face  ENT:  no lumps or lymphadenopathy, thyroid normal to palpation; oropharynx clear, no swelling or erythema  Skin: warm, no erythema or rashes  Lungs: clear to percussion and auscultation  Heart: regular rhythm and S1, S2 normal  Abdomen: soft, non-tender, no masses, no organomegaly  Extremities: Normal exam of the extremities. No swelling or pain.  Psych: no hurried speech, no flight of ideas, normal affect    Assessment/Plan   IMPRESSION:  Luz Elena Morris is a 24 y.o. female with a BMI of Body mass index is 50.31 kg/m². with the following diagnoses and co-morbidities:     Past Medical History:   Diagnosis Date    Dysmenorrhea, unspecified 08/15/2022    Menstrual cramps    Encounter for immunization 09/15/2020    Immunization due    Encounter for immunization  01/11/2017    Immunization due    Encounter for issue of repeat prescription 08/15/2022    Medication refill    Encounter for screening for disorder due to exposure to contaminants     Screening for lead exposure       This patient does meet the criteria for a surgical weight loss procedure according to NIH guidelines.    The risks of sleeve gastrectomy, Asher-en-Y gastric bypass, and duodenal switch surgery including but not limited to bleeding, leak along staple lines, infection, dehydration, ulcers, internal hernia, DVT/PE, pneumonia, myocardial infarction, prolonged nausea/vomiting, incomplete resolution of associated medical conditions, reflux, weight regain, vitamin/mineral deficiencies, and death have been explained to the patient and Luz Elena Morris has expressed understanding and acceptance of them.     We discussed the lifestyle changes necessary to be successful following surgery.    She wants to proceed with sleeve gastrectomy.    Post sleeve gastrectomy reflux, possible need for gastric bypass or additional surgical procedures and lack of data on long-term outcomes discussed with the patient.    The patient was advised not to become pregnant within 12-18 months following bariatric surgery. She was educated on the increased risks to mother and fetus associated with pregnancy within 2 years of bariatric surgery.    The possible benefits of the above surgeries including weight loss, improvement/resolution of associated medical and mental health conditions, improved mobility, and decreased mortality have been explained the the patient and Luz Elena Morris has expressed understanding and acceptance of them.      PLAN:  The plan of treatment for Luz Elena Morris is to continue with the consultations and tests ordered today in hopes of qualifying for pre-operative clearance for bariatric surgery. This includes:    Consult Nutrition for education and 6 months of MSWL  Consult Psychology  Consult Cardiology  Consult  Pulmonology if PAP  Labs ordered  EGD  PCP for medical optimization  Consult sleep medicine - concern for ROXANA  Recommend at least 15 lbs of weight loss prior to surgery.

## 2024-09-24 ENCOUNTER — OFFICE VISIT (OUTPATIENT)
Dept: SURGERY | Facility: HOSPITAL | Age: 24
End: 2024-09-24
Payer: COMMERCIAL

## 2024-09-24 VITALS
SYSTOLIC BLOOD PRESSURE: 145 MMHG | BODY MASS INDEX: 47.09 KG/M2 | DIASTOLIC BLOOD PRESSURE: 86 MMHG | WEIGHT: 293 LBS | HEIGHT: 66 IN

## 2024-09-24 DIAGNOSIS — E66.01 MORBID OBESITY WITH BMI OF 45.0-49.9, ADULT (MULTI): Primary | ICD-10-CM

## 2024-09-24 DIAGNOSIS — Z71.3 PRE-BARIATRIC SURGERY NUTRITION EVALUATION: ICD-10-CM

## 2024-09-24 DIAGNOSIS — R29.818 SUSPECTED SLEEP APNEA: ICD-10-CM

## 2024-09-24 DIAGNOSIS — Z98.84 BARIATRIC SURGERY STATUS: ICD-10-CM

## 2024-09-24 DIAGNOSIS — Z13.21 ENCOUNTER FOR VITAMIN DEFICIENCY SCREENING: ICD-10-CM

## 2024-09-24 DIAGNOSIS — E66.01 SEVERE OBESITY (BMI >= 40) (MULTI): ICD-10-CM

## 2024-09-24 PROCEDURE — 99205 OFFICE O/P NEW HI 60 MIN: CPT | Performed by: SURGERY

## 2024-09-24 PROCEDURE — 99215 OFFICE O/P EST HI 40 MIN: CPT | Performed by: SURGERY

## 2024-09-24 PROCEDURE — 3008F BODY MASS INDEX DOCD: CPT | Performed by: SURGERY

## 2024-09-24 PROCEDURE — 1036F TOBACCO NON-USER: CPT | Performed by: SURGERY

## 2024-09-27 NOTE — PROGRESS NOTES
"Surgeon: Tessa  Patient is considering:  sleeve gastrectomy    ASSESSMENT:  Current weight:  305 lbs   Ht: 65.5  in   BMI: 50.1       Initial start weight: 307 lbs  Pre-Op Excess Body Weight (EBW):   152 lbs  Target Post-Op weight goal:  208 - 231 lbs    Food allergies/intolerances:  NKFA  Chewing/Swallowing/Dentition:  none  Nausea / Vomiting / Hx Gastroparesis:  none  Diarrhea/ Constipation: h/o constipation, take laxatives PRN  Exercise level:  works as [employer] and on her feet for most of day; planning to start dance classes and walking local [employer]  Smoking/Tobacco use: recently quit vaping, stopped THC 16 months ago   Vitamins/Minerals supplements:  none   Medications:   see list  Past diet attempts:   portion control, stopped eating beef/pork - more lean proteins, fasting/starvation, IMF, exercise  Hours of sleep/night: \"messed up\"       Person responsible for cooking & shopping? Dad   Food Insecurity: Mild   How often do you eat sweet snacks?  Maybe 2x/month  How often do you eat savory snacks?  Maybe 2x/month   How often do you eat out?  2/month   Do you feel overly stuffed?  Yes   Binge Eating? Maybe about 4 months ago   Night Eating?  Yes   Emotional Eating?  Triggered by boredom        READINESS TO LEARN:  Motivation to learn: Interested        Understanding of instruction: Good    Anticipated Compliance: Good     Family Support: Fair from Dad     Educational Materials Provided:    Plate Method  High Protein Snack List  High Protein Drink  High Protein food list  Schedules for MSWL class and support group   Goals sheet    Patient will scheduled to attend a Virtual Education Class to review the 2 week Pre-op diet, Post op fluid, protein, vitamin/mineral supplementation, exercise goals and post op diet progression.    Patient presents with excessive calorie obesity seeking  sleeve gastrectomy.    Patient seen today to complete nutrition evaluation for weight loss surgery. Patient reports wanting surgery " in order to achieve better health and improve her QOL. She wants to prevent getting diabetes as she has had to watch her mom arleen through this disease and have an amputation. Patient states she is ready for a change and is motivated. Patient thinks her sleep is the largest contributor to her inability to lose weight. Often does not sleep well and sometimes snacks in the middle of the night. She is often tired through the day, which effect desire for exercise and healthy food choices. Patient is looking forward to having a sleep study soon.    Recommended to begin to eat 3 meals/day, avoid skipping meals. Encouraged to eat protein rich foods at each meal, balanced with fiber rich foods. Reviewed fluids to eliminate and replace with SF, non-carbonated, caffeine free fluids. Reviewed postop behaviors and encouraged to begin practicing. Recommended to start daily MVI. Patient would like to start with an exercise goal and will begin keeping a food journal to track her intake, exercise, fluids and any triggered or nighttime eating. We will reflect the journal at her next visit in October,     Patient was receptive to nutritional recommendations, asked numerous questions, and verbalized understanding of the weight loss surgery diet.  Patient expressed understanding about the importance of strict dietary compliance post-surgery to avoid nutritional deficiencies and achieve optimal weight loss and verbalized intent to follow dietary recommendations.      Malnutrition Screening:  Significant unintentional weight loss? No  Eating less than 75% of usual intake for more than 2 weeks? No      Nutrition Diagnosis:   1. Overweight/obesity related to excess energy intake as evidenced by BMI = 40 kg/m^2.  2. Food- and nutrition-related knowledge deficit related to lack of prior exposure to surgical weight loss information as evidenced by pt new to surgical program.    Nutrition Interventions:   1. Modify type and amount of food and  nutrients within meals and snacks.  2. Comprehensive Nutrition Education    Recommendations:  1. Begin keeping a food journal and record what you eat and drink daily. Note any times you eat when triggered by emotions like boredom, or if you eat in the middle of the night. We will reflect what you learn from this at your next session.  2. Start an exercise routine by walking at the local [employer] [place] with your dad, 3 days/week on Sunday, Monday and Tuesday.   3.  Begin daily multivitamin.  No gummies. Centrum adult complete, equate kids chewable, or Women's One a Day are all acceptable options.  4. Continue to drink 64oz of calorie-free, caffeine-free, and non-carbonated beverages.   5. Practice no drinking with meals and take small sips of fluids in between meals. Avoid chugging and gulping.  6. Your insurance requires 6 months of Medically Supervised Weight Loss (MSWL) classes. We will follow up for your 2nd visit on 10/24/24 at 2:00 PM. You will need to weigh yourself before this appointment and provide a 24 hour food recall.          MEAL PLANNING TIPS:  1. Build meals around protein rich foods. Aim for 3-4 ounces (20-30 grams) protein per meal. Lean proteins include chicken, turkey, fish, lean cuts of beef and 90% lean ground beef, pork, shrimp, low fat dairy products, and eggs.   2. Fill half your plate with non-starchy vegetables. Select high fiber starches like  sweet potatoes, peas, beans, lentils, quinoa, whole wheat breads and pastas, and brown rice. Keep portion of starches to 1/4 plate (1/2 cup - 1 cup per meal).  3. Limit snacking between meals to only as needed. IF you need a snack, select foods that are high in protein (7-15 grams) and fiber (4 grams or more per serving).    Pre-op Goal weight: lose 5% of body weight    Nutrition Monitoring and Evaluation: 1-2 pound weight loss per week  Criteria: weight check  Need for Follow-up:     Patient does meet National Institutes Health guidelines for weight  loss surgery, however needs to demonstrate consistent effort in making dietary changes before giving clearance. It is anticipated that the patient will need at least  2   nutritional follow-up visits prior to clearance for surgery.

## 2024-09-30 ENCOUNTER — NUTRITION (OUTPATIENT)
Dept: SURGERY | Facility: HOSPITAL | Age: 24
End: 2024-09-30
Payer: COMMERCIAL

## 2024-09-30 DIAGNOSIS — Z98.84 BARIATRIC SURGERY STATUS: Primary | ICD-10-CM

## 2024-09-30 DIAGNOSIS — E66.01 MORBID OBESITY WITH BMI OF 45.0-49.9, ADULT (MULTI): ICD-10-CM

## 2024-10-15 ENCOUNTER — HOSPITAL ENCOUNTER (OUTPATIENT)
Dept: RADIOLOGY | Facility: CLINIC | Age: 24
Discharge: HOME | End: 2024-10-15
Payer: COMMERCIAL

## 2024-10-15 ENCOUNTER — CLINICAL SUPPORT (OUTPATIENT)
Dept: PRIMARY CARE | Facility: CLINIC | Age: 24
End: 2024-10-15
Payer: COMMERCIAL

## 2024-10-15 DIAGNOSIS — Z11.3 SCREENING EXAMINATION FOR STD (SEXUALLY TRANSMITTED DISEASE): ICD-10-CM

## 2024-10-15 LAB
EST. AVERAGE GLUCOSE BLD GHB EST-MCNC: 97 MG/DL
HBA1C MFR BLD: 5 %
HIV 1+2 AB+HIV1 P24 AG SERPL QL IA: NONREACTIVE
TREPONEMA PALLIDUM IGG+IGM AB [PRESENCE] IN SERUM OR PLASMA BY IMMUNOASSAY: NONREACTIVE

## 2024-10-15 PROCEDURE — 87491 CHLMYD TRACH DNA AMP PROBE: CPT

## 2024-10-15 PROCEDURE — 83036 HEMOGLOBIN GLYCOSYLATED A1C: CPT

## 2024-10-15 PROCEDURE — 72100 X-RAY EXAM L-S SPINE 2/3 VWS: CPT | Performed by: RADIOLOGY

## 2024-10-15 PROCEDURE — 86780 TREPONEMA PALLIDUM: CPT

## 2024-10-15 PROCEDURE — 87389 HIV-1 AG W/HIV-1&-2 AB AG IA: CPT

## 2024-10-15 PROCEDURE — 72100 X-RAY EXAM L-S SPINE 2/3 VWS: CPT

## 2024-10-15 PROCEDURE — 87661 TRICHOMONAS VAGINALIS AMPLIF: CPT

## 2024-10-15 PROCEDURE — 36415 COLL VENOUS BLD VENIPUNCTURE: CPT

## 2024-10-16 ENCOUNTER — TELEPHONE (OUTPATIENT)
Dept: PRIMARY CARE | Facility: CLINIC | Age: 24
End: 2024-10-16

## 2024-10-16 DIAGNOSIS — L70.8 OTHER ACNE: Primary | ICD-10-CM

## 2024-10-16 LAB
C TRACH RRNA SPEC QL NAA+PROBE: NEGATIVE
N GONORRHOEA DNA SPEC QL PROBE+SIG AMP: NEGATIVE
T VAGINALIS RRNA SPEC QL NAA+PROBE: NEGATIVE

## 2024-10-16 RX ORDER — BENZOYL PEROXIDE 50 MG/ML
LIQUID TOPICAL EVERY 12 HOURS
Qty: 227 G | Refills: 3 | Status: SHIPPED | OUTPATIENT
Start: 2024-10-16

## 2024-10-16 RX ORDER — MUPIROCIN 20 MG/G
OINTMENT TOPICAL 2 TIMES DAILY
Qty: 30 G | Refills: 2 | Status: SHIPPED | OUTPATIENT
Start: 2024-10-16

## 2024-10-16 NOTE — TELEPHONE ENCOUNTER
Dr. Noe, this patient has been trying tro contact you about a letter that you supposed have given her on her last visit with camilo . She stated that it didn't have a date on it and it was denied. Also mentioned something about her restrictions and accommodations

## 2024-10-21 ENCOUNTER — EVALUATION (OUTPATIENT)
Dept: PHYSICAL THERAPY | Facility: HOSPITAL | Age: 24
End: 2024-10-21
Payer: COMMERCIAL

## 2024-10-21 DIAGNOSIS — M54.50 LOW BACK PAIN AT MULTIPLE SITES: ICD-10-CM

## 2024-10-21 DIAGNOSIS — M51.17 INTERVERTEBRAL DISC DISORDER WITH RADICULOPATHY OF LUMBOSACRAL REGION: Primary | ICD-10-CM

## 2024-10-21 PROCEDURE — 97161 PT EVAL LOW COMPLEX 20 MIN: CPT | Mod: GP | Performed by: PHYSICAL THERAPIST

## 2024-10-21 PROCEDURE — 97110 THERAPEUTIC EXERCISES: CPT | Mod: GP | Performed by: PHYSICAL THERAPIST

## 2024-10-21 ASSESSMENT — ENCOUNTER SYMPTOMS
OCCASIONAL FEELINGS OF UNSTEADINESS: 0
LOSS OF SENSATION IN FEET: 0
DEPRESSION: 0

## 2024-10-21 NOTE — PROGRESS NOTES
Initial evaluation  Physical Therapy Initial Evaluation    Patient Name:Luz Elena Morris  MRN:[id number]  Today's Date:10/21/2024  Referred by: Melina Noe     Time In: 1441  Time Out: 1525   Total Time: 44 minutes    Therapy Diagnosis  1. Low back pain at multiple sites    2.      Intervertebral disc disorder L/S region    Plan of Care  Planned interventions include PRN: therapeutic exercise, manual therapy, gait training, electrical stimulation, hot pack, HEP training.   Frequency and duration: 1-2 time(s) a week, for  2 weeks or 20 visits .   Plan of care was developed with input and agreement by the patient.     Plan for next session:   Review HEP  Progress glut med strengthening  Add thoracic extension and rotation   Dead bugs as tolerated  Safe lifting prep    Assessment  Pt presents with several deficits as outlined below that are significantly affecting work and overall function. She displays weakness of thoracic extensors and UE musculature, joint and muscular restrictions in thoracolumbar region affecting postural alignment, ability to maintain upright sitting or standing for extended periods. Pt shows weakness of hip/core and LE musculature, joint and muscular restrictions in lumbosacral, femoroacetabular region affecting pelvic and LE alignment, overall functional mobility. She will benefit from skilled PT in order to address the above mentioned deficits so that she can ultimately achieve LTG as set in POC and return to a pain-free, active lifestyle.     Problem List: Pain, range of motion/joint mobility, strength, activity limitations, ADLs/IADLs/self care skills, decreased functional level, balance, decreased knowledge of HEP, flexibility, gait/locomotion, and posture.     Patient is a 24 y.o. female who presents with complaint of low back pain with periodic radiation into R LE . Standardized testing and measures administered today reveal that the patient has multiple impairments in body structures  and functions, activity limitations, and participation restrictions. These include subjective and objective findings such as pain, tenderness to palpation of the affected area, decreased ROM, strength, flexibility, and function. The patient's impairments are likely influenced by mechanical dysfunction and deconditioning with possible overuse and degenerative changes. Skilled PT services are warranted in order to realize measurable and meaningful change in the above outcome measures and achieve improvements in the patient's functional status and individual goals.    Rehab Potential: good    Clinical Presentation: Stable and/or uncomplicated characteristics.     Evaluation Complexity: Low    Precautions/Fall Risk: diplopia, obesity, asthma Pacemaker no  Seizures No  Post Op Movement/Restrictions No    Insurance  Visit number: 1   Approved number of visits: TBD  Onset Date: 24  Certification Period:  Beginning:     10/21/2024            Endin24  [facility] / Plan: [facility] / Product Type: *No Product type* /     Subjective  Chief complaint/reason for visit: low back pain mainly centralized, periodic radiation into R LE  Mechanism of Injury:  a progressive, insidious condition  Location of Pain: L/S region with periodic radiation into R LE  Current Pain Level (0-10): 7   High Pain Level (0-10): 9   Low Pain Level (0-10): 7  Pain Quality: aching, pressure, and tightness with periodic burning into R LE  Pain Exacerbating Factors: movement, standing, walking; lifting at work  Pain Relieving Factors:  ibuprofen , used biofreeze and icy hot    Medical Screening: Reviewed medical history form with patient and medical screening assessed.   Red Flags: Do you have any of the following? 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  Work Status:  currently not working due to pain; had been working at [employer]  Current Status: remains unchanged  Patient Awareness: Patient is aware of  her diagnosis and prognosis.   Living Environment: house  Social Support:  family for support  Personal Factors That May Impact Care: none  Patient's Goal for Treatment: relieving pain, increasing strength, increasing mobility, improving positional tolerances, walking with a normal gait, reducing symptoms, returning to work, and resuming recreational exercise/social activities.     Objective   JALEEL: 27 raw    Observation/Posture: fwd head, rounded shoulders; increased thoracic kyphosis; R anterior pelvic rotation; excessive lumbar lordosis    Segmental Mobility: decreased PA glide thoracic spine limiting extension and upright posture; decreased lateral glide limiting rotation; decreased R posterior pelvic mobility; decreased L/S rotational mob    Gait: Pt ambulates with slight antalgic gait pattern; decreased SLS R LE, contralateral pelvic drop; knee hyperextension R LE    Palpation: all lumbar segments grossly TTP; R PSIS severe TTP; ischial tub proximal hamstring insertion TTP    ROM:  Thoracic extension mod/max deficit  Thoracic rotation mod/max deficit  Lumbar Flexion 60 degrees with pain initiating movement but stretch at end range  Lumbar Extension 10 degrees with pain  Lumbar Rotation L 15 degrees R 10 degrees with pain  Lumbar Sidebend L 10 degrees R 5 degrees with pain  Hip Flexion L 110 degrees R 100 degrees with pain  Hip Extension L 15  degrees R 10 degrees  Hip IR L 20 degrees R 15 degrees with pain  Hip ER L 35 degrees R 30 degrees    MMT:  Rhomboids 3/5  Mid Trap 3-/5  Lower Trap 3-/5  Lats 3/5  Shoulder Flexion 4/5  Shoulder Abduction 4+/5  Shoulder IR 4/5   Shoulder ER 4-/5  Lumbar Extensors 2+/5  Lower Abdominals 2+/5  Glut Med L 3/5 R 3-/5  Glut Max L 3/5 R 3-/5  Hamstrings L 3+/5 R 3/5  Adductors L 3/5 R 3/5    Sensation:  Intact zina    Special Testing  Supine to sit R anterior pelvic rotation  Slump + R  SLR -  Lumbar Quadrant + R  Ely's -  FADIR + R  LESLI + zina    Treatment Performed Today: Initial evaluation and patient education regarding diagnosis, prognosis, contributing factors, comorbidities, importance and instruction of HEP, role of PT, postural re-education, activity modification, and body mechanics.    Therapeutic Exercise  25 minutes  Education/Resources provided today: Home Program   [place]: Provided, reviewed and performed the following therapeutic exercises with the patient:  Access Code: [email or web address]  URL: [email or web address]  Date: 10/21/2024  Prepared by: Syed Jack    Exercises  - Supine SI Joint Self-Correction  - 1 x daily - 7 x weekly - 3 sets - 5 reps - 5 hold  - Hooklying Isometric Hip Abduction with Belt  - 1 x daily - 7 x weekly - 3 sets - 5 reps - 5 hold  - Supine Hip Adduction Isometric with Ball  - 1 x daily - 7 x weekly - 3 sets - 5 reps - 5 hold  - Supine Posterior Pelvic Tilt  - 1 x daily - 7 x weekly - 3 sets - 10 reps  - Supine Bridge with Mini Swiss Ball Between Knees  - 1 x daily - 7 x weekly - 3 sets - 5 reps - 5 hold  - Bilateral Shoulder Flexion Wall Slide with Towel  - 1 x daily - 7 x weekly - 1 sets - 10 reps - 10 hold  - Sidelying Open Book Thoracic Lumbar Rotation and Extension  - 1 x daily - 7 x weekly - 1 sets - 10 reps  - Cat Cow to Child's Pose  - 1 x daily - 7 x weekly - 3 sets - 10 reps  - Clamshell with Resistance  - 1 x daily - 3 x weekly - 3 sets - 10  reps  - DNS Bug Heel Touches  - 1 x daily - 7 x weekly - 3 sets - 10 reps        Response to Treatment: improved knowledge and understanding of condition, decreased pain, improved joint mobility/ROM, improved strength, improved flexibility, improved tissue mobility, improved posture, improved balance.    Goals: Goals set and discussed today.   Lumbar Goals  Lumbar Spine Goals:  By discharge, patient will:  1) Demonstrate independence with home exercise program  2) Tolerate PLOA without adverse reaction  3) Increase ROM of the lumbar spine to ? in order to improve the ability to perform essential ADLs  4) Increase strength of thoracic extensors and UE, hip/core and LE musculature in order to improve the ability to perform essential ADLs, work-related tasks  5) Improve positional tolerances of standing, sitting, walking > 2 hours consecutively for essential ADLs and work duties.   6) Report decrease in pain by >= 2 points to meet MCID  7) Decrease score of Modified JALEEL by > 6 points to meet the MCID  8) Ascend and descend 2 flights of stairs without an increase in symptoms in order to improve mobility in the home and community  9) Ambulate unlimited community distances without an increase in symptoms  10) Be able to sleep through the night without an increase in symptoms  11) Pt will safely lift and push 50 lbs consistently without increases in symptoms in order to return to full work duties     Patient stated goal:   Pt wishes to decrease pain and tension, restore normal strength and mobility, return to work without limitations, return to recreational exercise to assist with weight loss.

## 2024-10-23 ENCOUNTER — APPOINTMENT (OUTPATIENT)
Dept: PODIATRY | Facility: CLINIC | Age: 24
End: 2024-10-23
Payer: COMMERCIAL

## 2024-10-23 DIAGNOSIS — B35.1 ONYCHOMYCOSIS: ICD-10-CM

## 2024-10-23 DIAGNOSIS — M79.674 TOE PAIN, RIGHT: Primary | ICD-10-CM

## 2024-10-23 DIAGNOSIS — M79.675 TOE PAIN, LEFT: ICD-10-CM

## 2024-10-23 PROCEDURE — 99202 OFFICE O/P NEW SF 15 MIN: CPT | Performed by: PODIATRIST

## 2024-10-24 ENCOUNTER — NUTRITION (OUTPATIENT)
Dept: SURGERY | Facility: CLINIC | Age: 24
End: 2024-10-24
Payer: COMMERCIAL

## 2024-10-24 DIAGNOSIS — Z98.84 BARIATRIC SURGERY STATUS: Primary | ICD-10-CM

## 2024-10-24 NOTE — PROGRESS NOTES
Bariatric Surgery Program - [facility]    PREOPERATIVE, MULTIDISCIPLINARY, MEDICALLY SUPERVISED, REDUCED CALORIE DIET, BEHAVIOR MODIFICATION AND EXERCISE PROGRAM       S: MSWL 2/6  IW: 307#    Patient expressed frustration with gaining weight since last month. Patient reports she has been focused on  quitting smoking, stopped vaping, and working trying to quit this month.   Reports drinking water and tea, no alcohol this past month. Patient reports she has eaten less fast food. Patient feels she is making better food choices overall, and wants to begin keeping a food journal to better track her intake and frequency of fast food/dining out as well as exercise. She is still working on her sleep routine, reports she did wake in the middle of the night and ate half a burger. Patient will track this frequency as well.      Exercise: andrew videos at home    O: Wt: 314 lb   Ht:  65.5 in      Goal: 5% body weight loss over the course of program    Dietary recommendation:   1.          Begin keeping a food journal and record what you eat and drink daily. Note any times you eat when triggered by emotions like boredom, or if you eat in the middle of the night. We will reflect what you learn from this at your next session.  2.          Track your frequency and duration of exercise in your journal.   3.          Begin daily multivitamin.  No gummies. Centrum adult complete, equate kids chewable, or Women's One a Day are all acceptable options.  4.          Continue to drink 64oz of calorie-free, caffeine-free, and non-carbonated beverages.   5. Practice no drinking with meals and take small sips of fluids in between meals. Avoid chugging and gulping.        A/P: Pt reports discouraged with gaining weight, but feels motivated to move forward and work on recommendations discsused today. Patient has a goal to use a food journal to help her better track her intake of meals/snacks and frequency of dining out as well  as fluid intake and exercise. Patient will bring journal to review at next month's visit.

## 2024-10-28 RX ORDER — CICLOPIROX 80 MG/ML
SOLUTION TOPICAL NIGHTLY
Qty: 6.6 ML | Refills: 3 | Status: SHIPPED | OUTPATIENT
Start: 2024-10-28

## 2024-11-18 ENCOUNTER — PROCEDURE VISIT (OUTPATIENT)
Dept: SLEEP MEDICINE | Facility: HOSPITAL | Age: 24
End: 2024-11-18
Payer: COMMERCIAL

## 2024-11-18 DIAGNOSIS — G47.33 OSA (OBSTRUCTIVE SLEEP APNEA): ICD-10-CM

## 2024-11-18 PROBLEM — H52.209 ASTIGMATISM: Status: ACTIVE | Noted: 2024-11-18

## 2024-11-18 PROBLEM — L30.9 ECZEMA: Status: ACTIVE | Noted: 2024-11-18

## 2024-11-18 PROBLEM — H52.10 AXIAL MYOPIA: Status: ACTIVE | Noted: 2024-11-18

## 2024-11-18 PROBLEM — L70.9 ACNE: Status: ACTIVE | Noted: 2024-11-18

## 2024-11-18 PROBLEM — L73.2 HIDRADENITIS SUPPURATIVA: Status: ACTIVE | Noted: 2024-11-18

## 2024-11-18 PROBLEM — E28.2 POLYCYSTIC OVARY SYNDROME: Status: ACTIVE | Noted: 2024-11-18

## 2024-11-18 PROBLEM — G47.30 SLEEP-DISORDERED BREATHING: Status: ACTIVE | Noted: 2024-11-18

## 2024-11-18 PROBLEM — J30.9 ALLERGIC RHINITIS: Status: ACTIVE | Noted: 2024-11-18

## 2024-11-18 PROBLEM — S39.92XA INJURY OF BACK: Status: ACTIVE | Noted: 2023-12-21

## 2024-11-18 PROBLEM — L83 ACANTHOSIS NIGRICANS: Status: ACTIVE | Noted: 2024-11-18

## 2024-11-18 PROBLEM — N94.6 MENSTRUAL CRAMPS: Status: ACTIVE | Noted: 2024-11-18

## 2024-11-19 VITALS
HEIGHT: 65 IN | WEIGHT: 293 LBS | RESPIRATION RATE: 24 BRPM | BODY MASS INDEX: 48.82 KG/M2 | OXYGEN SATURATION: 99 % | DIASTOLIC BLOOD PRESSURE: 84 MMHG | SYSTOLIC BLOOD PRESSURE: 125 MMHG

## 2024-11-19 NOTE — PROGRESS NOTES
[facility] TECH NOTE:     Patient: Luz Elena Morris   MRN//AGE: [id number]  2000  24 y.o.   Technologist: RADHA GARDNER   Room: 2   Service Date: 2024        Sleep Testing Location: [facility]    Mio:     TECHNOLOGIST SLEEP STUDY PROCEDURE NOTE:   This sleep study is being conducted according to the policies and procedures outlined by the AAS accreditation standards.  The sleep study procedure and processes involved during this appointment was explained to the patient/patient’s family, questions were answered. The patient/family verbalized understanding.      The patient is a 24 y.o. year old female scheduled for aDiagnostic PSG Split night with montage of:  PSG . she arrived for her appointment.      The study that was ultimately completed was a Diagnostic  with montage of:  PSG .    The full study Was completed.  Patient questionnaires completed?: yes     Consents signed? yes    Initial Fall Risk Screening:     Luz Elena has fallen in the last 6 months. her did result in injury. Luz Elena does not have a fear of falling. He does not need assistance with sitting, standing, or walking. she does not need assistance walking in her home. she does not need assistance in an unfamiliar setting. The patient is notusing an assistive device.     Brief Study observations: Arousals, desaturations, respiratory events and snoring was observed. Patient did not meet split night criteria due to insufficient AHI.      Deviation to order/protocol and reason: None      If PAP, which was preferred mask/pressure/mode: None      Other:None    After the procedure, the patient/family was informed to ensure followup with ordering clinician for testing results.      Technologist: RADHA GARDNER

## 2024-11-25 ENCOUNTER — NUTRITION (OUTPATIENT)
Dept: SURGERY | Facility: HOSPITAL | Age: 24
End: 2024-11-25
Payer: COMMERCIAL

## 2024-11-25 ENCOUNTER — APPOINTMENT (OUTPATIENT)
Dept: PRIMARY CARE | Facility: CLINIC | Age: 24
End: 2024-11-25
Payer: COMMERCIAL

## 2024-11-25 DIAGNOSIS — E66.01 MORBID OBESITY WITH BMI OF 45.0-49.9, ADULT (MULTI): ICD-10-CM

## 2024-11-25 DIAGNOSIS — Z98.84 BARIATRIC SURGERY STATUS: Primary | ICD-10-CM

## 2024-11-25 NOTE — PROGRESS NOTES
Bariatric Surgery Program - [facility]    PREOPERATIVE, MULTIDISCIPLINARY, MEDICALLY SUPERVISED, REDUCED CALORIE DIET, BEHAVIOR MODIFICATION AND EXERCISE PROGRAM       S: SAYRA 3/6  IW: 307#    Patient reports she's struggling with quitting smoking. Hoping to be done with it so she can get her labs completed next month.  States she started food journaling. This has helped her to notice that she is not eating as many fruits an vegetables as she thought. Patient expresses concern with accessibility to fresh fruits/vegetables and was informed of the Food for Life program at . Patient also is wanting to  [employer] to help pay for food.   Encouraged to keep a daily food journal as this has helped her better monitor her portion sizes and food choices.    Exercise: andrew videos on [employer]    O: Wt: 318 lb   Ht:  65.5 in   BMI: 52.8    Goal: 5% body weight loss over the course of program    Dietary recommendation:   Continue keeping a food journal and record what you eat and drink daily.  Reflect weekly.  Continue to drink >64oz water daily. Avoid pop and other high calorie beverages.  3. Practice the 30-30-30 rule by drinking between meals.  4. Structure your meal plan - have 3 meals and 1 snack daily.  5. Have balanced meals that always contain a good source of protein.  6. Increase intake of non-starchy vegetables.  Have 5 servings fruits and vegetables daily.   7. Take a multivitamin daily.  8. Increase physical activity by 10-15 minutes to an end goal of 60 minutes 5 x per week.         A/P: Pt appears discouraged with weight gain. Patient has a goal to keep using food journal and reflect weekly on her food chocies/patterns. Patient will follow up in 1 month with weight check and diet recall.

## 2024-12-03 ENCOUNTER — TELEPHONE (OUTPATIENT)
Dept: SLEEP MEDICINE | Facility: CLINIC | Age: 24
End: 2024-12-03
Payer: COMMERCIAL

## 2024-12-03 DIAGNOSIS — G47.33 OSA (OBSTRUCTIVE SLEEP APNEA): Primary | ICD-10-CM

## 2024-12-03 NOTE — TELEPHONE ENCOUNTER
Luz Elena had an in-lab sleep study on 11/18/2024 at  Sleep Lab, which revealed ROXANA with an overall AHI4% of 11.9, AHI3% of 33.6. and an SpO2 naveen of 87%. Starting her on auto-CPAP of 6-16 cm H2O via MSC. I left her a message regarding her sleep study results and the plan.     Drake Altamirano MD, [facility], [facility]  Staff Physician  Division of Pulmonary, Critical Care, and Sleep Medicine  [facility]  Clinical Associate Professor of Medicine  [facility]

## 2024-12-06 ENCOUNTER — TELEPHONE (OUTPATIENT)
Dept: GASTROENTEROLOGY | Facility: HOSPITAL | Age: 24
End: 2024-12-06
Payer: COMMERCIAL

## 2024-12-10 ENCOUNTER — ANESTHESIA (OUTPATIENT)
Dept: GASTROENTEROLOGY | Facility: HOSPITAL | Age: 24
End: 2024-12-10
Payer: COMMERCIAL

## 2024-12-10 ENCOUNTER — ANESTHESIA EVENT (OUTPATIENT)
Dept: GASTROENTEROLOGY | Facility: HOSPITAL | Age: 24
End: 2024-12-10

## 2024-12-11 ENCOUNTER — DOCUMENTATION (OUTPATIENT)
Dept: SURGERY | Facility: CLINIC | Age: 24
End: 2024-12-11
Payer: COMMERCIAL

## 2024-12-12 ENCOUNTER — APPOINTMENT (OUTPATIENT)
Dept: PULMONOLOGY | Facility: CLINIC | Age: 24
End: 2024-12-12
Payer: COMMERCIAL

## 2025-01-10 ENCOUNTER — APPOINTMENT (OUTPATIENT)
Facility: CLINIC | Age: 25
End: 2025-01-10
Payer: COMMERCIAL

## 2025-01-28 ENCOUNTER — DOCUMENTATION (OUTPATIENT)
Dept: SURGERY | Facility: HOSPITAL | Age: 25
End: 2025-01-28

## 2025-01-28 ENCOUNTER — APPOINTMENT (OUTPATIENT)
Dept: PODIATRY | Facility: CLINIC | Age: 25
End: 2025-01-28
Payer: COMMERCIAL

## 2025-01-28 NOTE — PROGRESS NOTES
Chart and bariatric list reviewed. Message sent via [employer] regarding continuation in bariatric surgery program.

## 2025-02-03 ENCOUNTER — DOCUMENTATION (OUTPATIENT)
Dept: SURGERY | Facility: CLINIC | Age: 25
End: 2025-02-03
Payer: COMMERCIAL

## 2025-02-03 NOTE — PROGRESS NOTES
Received staff message from [employer] requesting for me to send the pt an updated work list as the pt would like to resume with her bariatric surgical journey. Work list updated and sent via [employer].

## 2025-02-04 ENCOUNTER — TELEPHONE (OUTPATIENT)
Dept: SURGERY | Facility: HOSPITAL | Age: 25
End: 2025-02-04
Payer: COMMERCIAL

## 2025-02-04 ENCOUNTER — NUTRITION (OUTPATIENT)
Dept: SURGERY | Facility: HOSPITAL | Age: 25
End: 2025-02-04
Payer: COMMERCIAL

## 2025-02-04 DIAGNOSIS — Z98.84 BARIATRIC SURGERY STATUS: Primary | ICD-10-CM

## 2025-02-04 DIAGNOSIS — E66.01 CLASS 3 SEVERE OBESITY DUE TO EXCESS CALORIES WITH BODY MASS INDEX (BMI) OF 50.0 TO 59.9 IN ADULT, UNSPECIFIED WHETHER SERIOUS COMORBIDITY PRESENT: ICD-10-CM

## 2025-02-04 DIAGNOSIS — E66.813 CLASS 3 SEVERE OBESITY DUE TO EXCESS CALORIES WITH BODY MASS INDEX (BMI) OF 50.0 TO 59.9 IN ADULT, UNSPECIFIED WHETHER SERIOUS COMORBIDITY PRESENT: ICD-10-CM

## 2025-02-04 NOTE — PROGRESS NOTES
Bariatric Surgery Program - [facility]    PREOPERATIVE, MULTIDISCIPLINARY, MEDICALLY SUPERVISED, REDUCED CALORIE DIET, BEHAVIOR MODIFICATION AND EXERCISE PROGRAM       S: SAYRA 4/6  IW: 307#    Patient last seen by RD in November '24. Patient reports she has been working at reducing pop and sugary beverage intake, has also been avoiding alcohol. Patient reports she has been drinking mostly water. She recently went to the grocery store to buy healthy foods. Wants to start doing some meal prep. Patient expressed frustration with weight gain since starting program. Thinks food journaling might help, and patient was offered a 1500 calorie meal plan to learn portions and meal balancing. Patient encouraged to continue to exercise.   Reviewed postop behaviors to practice.       Exercise: andrew videos 3 days/week    O: Wt: 327 lb   Ht:  65 in   BMI: 54.4    Goal: 5% body weight loss over the course of program    Dietary recommendation:   Use the 1500 calorie meal plan and blank food logs to help with portion control. Record what you eat/drink daily into the food logs or a food journal.  Continue to drink 64 ounces (8 cups) of non-carbonated, sugar free, caffeine free fluids daily.   3. Practice the 30-30-30 rule by drinking between meals.  4. Take a multivitamin daily.  5. Increase physical activity by 10-15 minutes to an end goal of 60 minutes 5 x per week.         A/P: Pt appears to be motivated to use a food journal to help her track her intake of food and fluids more closely to see if this will help promote weight loss. She will try to use the meal plan provided today. Patient has a goal to maintain fluid goals, and will start practicing postop behaviors. Follow up in 1 month.

## 2025-02-10 ENCOUNTER — TELEPHONE (OUTPATIENT)
Dept: SURGERY | Facility: HOSPITAL | Age: 25
End: 2025-02-10
Payer: COMMERCIAL

## 2025-02-18 ENCOUNTER — DOCUMENTATION (OUTPATIENT)
Dept: SURGERY | Facility: HOSPITAL | Age: 25
End: 2025-02-18
Payer: COMMERCIAL

## 2025-02-19 ENCOUNTER — TELEPHONE (OUTPATIENT)
Dept: SURGERY | Facility: HOSPITAL | Age: 25
End: 2025-02-19
Payer: COMMERCIAL

## 2025-02-20 ENCOUNTER — TELEPHONE (OUTPATIENT)
Dept: GASTROENTEROLOGY | Facility: HOSPITAL | Age: 25
End: 2025-02-20
Payer: COMMERCIAL

## 2025-02-24 NOTE — H&P
History Of Present Illness  Luz Elena Morris is a 24 y.o. female presenting with morbid obesity with a BMI of 52.8.  She is currently undergoing consultations and obtaining clearances for bariatric surgery and presents today for an EGD.    Past Medical History  Past Medical History:   Diagnosis Date    Asthma     Dysmenorrhea, unspecified 08/15/2022    Menstrual cramps    Encounter for immunization 09/15/2020    Immunization due    Encounter for immunization 2017    Immunization due    Encounter for issue of repeat prescription 08/15/2022    Medication refill    Encounter for screening for disorder due to exposure to contaminants     Screening for lead exposure    ROXANA (obstructive sleep apnea)     waiting on cpap       Surgical History  No past surgical history on file.     Social History  She reports that she has been smoking cigars and cigarettes. She started smoking about 4 years ago. She has a 1.2 pack-year smoking history. She has never used smokeless tobacco. She reports that she does not currently use alcohol after a past usage of about 2.0 standard drinks of alcohol per week. She reports that she does not currently use drugs after having used the following drugs: Marijuana.    Family History  Family History   Problem Relation Name Age of Onset    Diabetes Mother Kashif Morris             Stroke Mother Kashif Morris     Other (amputation) Mother Kashif Morris         Allergies  Patient has no known allergies.    Review of Systems  Constitutional: Negative  Gastrointestinal: Negative  Respiratory: Negative  Cardiovascular: Negative  Skin: Negative  Musculoskeletal: Negative  Neurological: Negative  Endocrine: Negative  Behavioral: Negative    Physical Exam  Patient is in no acute distress  No respiratory distress  Chest is clear to auscultation  Abdomen is soft, not tender, not distended  No guarding   Patient is alert and oriented x 3    Last Recorded Vitals  There were no vitals taken  for this visit.       Assessment/Plan   24-year-old female with morbid obesity presenting today for a prebariatric EGD    Plan:  - EGD with biopsies for H. pylori      I spent 30 minutes in the professional and overall care of this patient.      Bob Clement MD

## 2025-02-25 ENCOUNTER — APPOINTMENT (OUTPATIENT)
Dept: GASTROENTEROLOGY | Facility: HOSPITAL | Age: 25
End: 2025-02-25
Payer: COMMERCIAL

## 2025-03-06 ENCOUNTER — DOCUMENTATION (OUTPATIENT)
Dept: SURGERY | Facility: CLINIC | Age: 25
End: 2025-03-06

## 2025-03-06 ENCOUNTER — NUTRITION (OUTPATIENT)
Dept: SURGERY | Facility: CLINIC | Age: 25
End: 2025-03-06
Payer: COMMERCIAL

## 2025-03-06 DIAGNOSIS — E66.01 CLASS 3 SEVERE OBESITY DUE TO EXCESS CALORIES WITH BODY MASS INDEX (BMI) OF 50.0 TO 59.9 IN ADULT, UNSPECIFIED WHETHER SERIOUS COMORBIDITY PRESENT: ICD-10-CM

## 2025-03-06 DIAGNOSIS — E66.813 CLASS 3 SEVERE OBESITY DUE TO EXCESS CALORIES WITH BODY MASS INDEX (BMI) OF 50.0 TO 59.9 IN ADULT, UNSPECIFIED WHETHER SERIOUS COMORBIDITY PRESENT: ICD-10-CM

## 2025-03-06 DIAGNOSIS — Z98.84 BARIATRIC SURGERY STATUS: Primary | ICD-10-CM

## 2025-03-06 NOTE — PROGRESS NOTES
Bariatric Surgery Program - [facility]    PREOPERATIVE, MULTIDISCIPLINARY, MEDICALLY SUPERVISED, REDUCED CALORIE DIET, BEHAVIOR MODIFICATION AND EXERCISE PROGRAM       S: MSWL 5/6  IW: 307#    Reports current weight at home scale is 319#. Patient reports she has printed out her meal plan and food logs that was sent last month. Patient reports overall things are going well, dad is more supportive. Patient noticed being more busy this past month with work and was starting to skip meals. Discussed prepping meals for busier days and using reminders. Also advised she could have a protein drink in replacement to her meals. Patient reports drinking water and vitamin water zero. Patient thinks she will have more time this month to exercise and will work towards a goal of 5 days/week for 30 minutes.      Exercise: none    O: Wt: 325 lb   Ht:  65 in   BMI: 54    Goal: 5% body weight loss over the course of program    Dietary recommendation:   Continue to use the 1500 calorie meal plan and blank food logs to help with portion control. Record what you eat/drink daily into the food logs or a food journal.  Continue to drink 64 ounces (8 cups) of non-carbonated, sugar free, caffeine free fluids daily.   3. Practice the 30-30-30 rule by drinking between meals.  4. Take a multivitamin daily.  5. Increase physical activity by 10-15 minutes to an end goal of 60 minutes 5 x per week. Track the days you exercise on your food logs.         A/P: Pt is excited to have lost some weight this month and is motivated to add more exercise into her week as she will have more time. Patient has found meal plan helpful and will continue to use it. Patient will follow up in 1 month.

## 2025-03-17 ENCOUNTER — OFFICE VISIT (OUTPATIENT)
Dept: CARDIOLOGY | Facility: CLINIC | Age: 25
End: 2025-03-17
Payer: COMMERCIAL

## 2025-03-17 VITALS
SYSTOLIC BLOOD PRESSURE: 126 MMHG | OXYGEN SATURATION: 99 % | BODY MASS INDEX: 52.18 KG/M2 | TEMPERATURE: 98.2 F | RESPIRATION RATE: 16 BRPM | WEIGHT: 293 LBS | DIASTOLIC BLOOD PRESSURE: 87 MMHG | HEART RATE: 80 BPM

## 2025-03-17 DIAGNOSIS — E66.01 SEVERE OBESITY (BMI >= 40) (MULTI): ICD-10-CM

## 2025-03-17 DIAGNOSIS — F17.290 CIGAR SMOKER: ICD-10-CM

## 2025-03-17 DIAGNOSIS — G47.30 SLEEP-DISORDERED BREATHING: ICD-10-CM

## 2025-03-17 DIAGNOSIS — Z01.810 ENCOUNTER FOR PRE-OPERATIVE CARDIOVASCULAR CLEARANCE: ICD-10-CM

## 2025-03-17 DIAGNOSIS — Z98.84 BARIATRIC SURGERY STATUS: Primary | ICD-10-CM

## 2025-03-17 PROCEDURE — 99406 BEHAV CHNG SMOKING 3-10 MIN: CPT | Performed by: REGISTERED NURSE

## 2025-03-17 PROCEDURE — 99213 OFFICE O/P EST LOW 20 MIN: CPT | Performed by: REGISTERED NURSE

## 2025-03-17 PROCEDURE — 99203 OFFICE O/P NEW LOW 30 MIN: CPT | Performed by: REGISTERED NURSE

## 2025-03-17 PROCEDURE — 93005 ELECTROCARDIOGRAM TRACING: CPT | Performed by: REGISTERED NURSE

## 2025-03-17 ASSESSMENT — PAIN SCALES - GENERAL: PAINLEVEL_OUTOF10: 0-NO PAIN

## 2025-03-18 PROBLEM — Z98.84 BARIATRIC SURGERY STATUS: Status: ACTIVE | Noted: 2025-03-18

## 2025-03-18 PROBLEM — Z01.810 ENCOUNTER FOR PRE-OPERATIVE CARDIOVASCULAR CLEARANCE: Status: ACTIVE | Noted: 2025-03-18

## 2025-03-18 PROBLEM — F17.290 CIGAR SMOKER: Status: ACTIVE | Noted: 2025-03-18

## 2025-03-18 ASSESSMENT — ENCOUNTER SYMPTOMS
LIGHT-HEADEDNESS: 0
IRREGULAR HEARTBEAT: 0
SYNCOPE: 0
ORTHOPNEA: 0
DYSPNEA ON EXERTION: 1
PND: 0
SLEEP DISTURBANCES DUE TO BREATHING: 1
WEIGHT LOSS: 1
CLAUDICATION: 0
NEAR-SYNCOPE: 0
DIZZINESS: 0
PALPITATIONS: 0

## 2025-03-18 NOTE — PROGRESS NOTES
Cardiology Clinic Note    Reason for Visit: New Patient Visit.  Referring Clinician: Tessa     History of Present Illness: Luz Elena Morris is a 24 y.o. female who presents for New Patient Visit for cardiac risk assessment. Her past medical hx is significant for light daily smoker (2 black and milds/week), asthma, ROXANA on CPAP, class 3 obesity with BMI 52.18, she is in bariatric surgery program.   Family hx- mother,  age earlys 50's from uncontrolled DM and stroke.   She has sleep apnea, wears CPAP consistently. Used to be very active as a teenager. She is doing well on diet and lifestyle changes.   BURCIAGA but likely related to alveolar hypoventilation. Good functional capacity otherwise.   Past Medical History:  She has a past medical history of Asthma, Dysmenorrhea, unspecified (08/15/2022), Encounter for immunization (09/15/2020), Encounter for immunization (2017), Encounter for issue of repeat prescription (08/15/2022), Encounter for screening for disorder due to exposure to contaminants, and ROXANA (obstructive sleep apnea).    Past Surgical History:  She has no past surgical history on file.    Social History:  She reports that she has been smoking cigars and cigarettes. She started smoking about 4 years ago. She has a 1.2 pack-year smoking history. She has never used smokeless tobacco. She reports that she does not currently use alcohol after a past usage of about 2.0 standard drinks of alcohol per week. She reports that she does not currently use drugs after having used the following drugs: Marijuana.    Family History:  Family History   Problem Relation Name Age of Onset    Diabetes Mother Kashif Morris             Stroke Mother Kashif Morris     Other (amputation) Mother Kashif Morris        Allergies:  Patient has no known allergies.    Outpatient Medications:  Current Outpatient Medications   Medication Instructions    acetaminophen (TYLENOL) 650 mg, oral, Every 6 hours PRN     "benzoyl peroxide 5 % external wash Topical, Every 12 hours    cetirizine (ZYRTEC) 10 mg, Daily PRN    ciclopirox (Penlac) 8 % solution Topical, Nightly    fluticasone (Flonase) 50 mcg/actuation nasal spray 1 spray, Daily    ibuprofen 600 mg, oral, Every 6 hours PRN    mupirocin (Bactroban) 2 % ointment Topical, 2 times daily, apply sparingly to affected area       Review of Systems:  Review of Systems   Constitutional: Positive for weight loss. Negative for malaise/fatigue.   Cardiovascular:  Positive for dyspnea on exertion. Negative for chest pain, claudication, cyanosis, irregular heartbeat, leg swelling, near-syncope, orthopnea, palpitations, paroxysmal nocturnal dyspnea and syncope.   Respiratory:  Positive for sleep disturbances due to breathing.    Neurological:  Negative for dizziness and light-headedness.       Last Recorded Vitals:  Vitals:    03/17/25 1103   BP: 126/87   Pulse: 80   Resp: 16   Temp: 36.8 °C (98.2 °F)   TempSrc: Temporal   SpO2: 99%   Weight: 144 kg (317 lb)       Physical Examination:  GENERAL: NAD, central adiposity   HEENT: no JVD  CV: RRR without m/r/g  PULM: CTAB  EXT: non-edematous bilateral lower extremities    Laboratory Studies:  Lab Results   Component Value Date    GLUCOSE 88 11/29/2021     No results found for: \"ALT\", \"AST\", \"GGT\", \"ALKPHOS\", \"BILITOT\"      Lab Results   Component Value Date    CHOL 150 11/29/2021     Lab Results   Component Value Date    HDL 31.5 (A) 11/29/2021     No results found for: \"LDLCALC\"  No results found for: \"TRIG\"  No components found for: \"CHOLHDL\"  Lab Results   Component Value Date    HGBA1C 5.0 10/15/2024     No components found for: \"UACR\"    Cardiology Tests:  ECG:  ECG 12 lead (Clinic Performed) 03/17/2025 (Preliminary)      Echo:No results found for this or any previous visit from the past 1095 days.      Cath:No results found for this or any previous visit from the past 1095 days.      Stress Test:No results found for this or any previous " visit from the past 1095 days.      Cardiac Imaging:No results found for this or any previous visit from the past 1095 days.      The ASCVD Risk score (Radha DK, et al., 2019) failed to calculate for the following reasons:    The 2019 ASCVD risk score is only valid for ages 40 to 79     Assessment and Plan:  Problem List Items Addressed This Visit       Severe obesity (BMI >= 40) (Multi)     In bariatric surgery program. Discussed the benefits of weight loss on overall CV health          Sleep-disordered breathing     Continue PAP therapy as prescribed.          Encounter for pre-operative cardiovascular clearance     Luz Elena Morris is at low perioperative cardiovascular risk for this intermediate risk procedure.   Luz Elena Morris is able to do >4 METS of activity without cardiopulmonary limitation.  Luz Elena Morris may proceed to surgery without any further cardiovascular testing.   Luz Elena Morris should follow up with his/her primary care physician and surgeon per routine.  I would be happy to see Luz Elena Morris back in the office in 1 year for repeat assessment if needed.         Bariatric surgery status - Primary    Relevant Orders    ECG 12 lead (Clinic Performed) (Completed)    Cigar smoker     Light Black and Mild smoker. I spent about 8 minutes discussing smoking cessation strategies. She is motivated to quit before bariatric surgery referral, does not feel like she needs NRT.            Follow up in SILAS Mata, RY, APRN-CNP  Clinician,  [facility]  Lead Clinician, ALBINA Finn

## 2025-03-18 NOTE — ASSESSMENT & PLAN NOTE
Luz Elena Morris is at low perioperative cardiovascular risk for this intermediate risk procedure.   Luz Elena Morris is able to do >4 METS of activity without cardiopulmonary limitation.  Luz Elena Morris may proceed to surgery without any further cardiovascular testing.   Luz Elena Morris should follow up with his/her primary care physician and surgeon per routine.  I would be happy to see Luz Elena Morris back in the office in 1 year for repeat assessment if needed.

## 2025-03-18 NOTE — ASSESSMENT & PLAN NOTE
Light Black and Mild smoker. I spent about 8 minutes discussing smoking cessation strategies. She is motivated to quit before bariatric surgery referral, does not feel like she needs NRT.

## 2025-04-10 ENCOUNTER — NUTRITION (OUTPATIENT)
Dept: SURGERY | Facility: CLINIC | Age: 25
End: 2025-04-10
Payer: COMMERCIAL

## 2025-04-10 DIAGNOSIS — E66.813 CLASS 3 SEVERE OBESITY DUE TO EXCESS CALORIES WITH BODY MASS INDEX (BMI) OF 50.0 TO 59.9 IN ADULT, UNSPECIFIED WHETHER SERIOUS COMORBIDITY PRESENT: ICD-10-CM

## 2025-04-10 DIAGNOSIS — E66.01 CLASS 3 SEVERE OBESITY DUE TO EXCESS CALORIES WITH BODY MASS INDEX (BMI) OF 50.0 TO 59.9 IN ADULT, UNSPECIFIED WHETHER SERIOUS COMORBIDITY PRESENT: ICD-10-CM

## 2025-04-10 DIAGNOSIS — Z98.84 BARIATRIC SURGERY STATUS: Primary | ICD-10-CM

## 2025-04-10 NOTE — PROGRESS NOTES
Bariatric Surgery Program - [facility]    PREOPERATIVE, MULTIDISCIPLINARY, MEDICALLY SUPERVISED, REDUCED CALORIE DIET, BEHAVIOR MODIFICATION AND EXERCISE PROGRAM       S: MSWL 6/6  IW: 307#, program high 325#    Patient reports she is going to stop moving forward with bariatric surgery at this time. She has lost about 15# in last 3 months and feels good about this and confident she can lose weight without having surgery. Patient reports she is eating smaller portions, and has been more mindful of her hunger/satiety cues. Patient reports she is exercising, and would like to continue to increase her exercise by adding more dance/pole classes. Patient made aware she can follow up with outpatient RD or health coaching to help her meet her goals if needed. Applauded her success and encouraged to continue with regimen.       O: Wt: 317 lb (3/17/25)   Ht:  65 in   BMI: 52    Goal: 5% body weight loss over the course of program    Dietary recommendation:   Continue to use the 1500 calorie meal plan and blank food logs to help with portion control. Record what you eat/drink daily into the food logs or a food journal.  Continue to drink 64 ounces (8 cups) of low/zero calorie beverages daily.  3. Take a multivitamin daily.  4. Increase physical activity by 10-15 minutes to an end goal of 60 minutes 5 x per week. Track the days you exercise on your food logs.         A/P: Pt is wanting to stop movement to bariatric surgery at this time. No further follow up scheduled at this time and should patient change her mind down the road, she will need to re-start process. Patient encouraged to get referral for outpatient RD for ongoing nutrition education and weight loss management.

## 2025-05-12 ENCOUNTER — APPOINTMENT (OUTPATIENT)
Dept: OBSTETRICS AND GYNECOLOGY | Facility: CLINIC | Age: 25
End: 2025-05-12
Payer: COMMERCIAL

## 2025-08-02 ENCOUNTER — CLINICAL SUPPORT (OUTPATIENT)
Dept: EMERGENCY MEDICINE | Facility: HOSPITAL | Age: 25
End: 2025-08-02
Payer: COMMERCIAL

## 2025-08-02 ENCOUNTER — APPOINTMENT (OUTPATIENT)
Dept: RADIOLOGY | Facility: HOSPITAL | Age: 25
End: 2025-08-02
Payer: COMMERCIAL

## 2025-08-02 ENCOUNTER — HOSPITAL ENCOUNTER (EMERGENCY)
Facility: HOSPITAL | Age: 25
Discharge: HOME | End: 2025-08-02
Payer: COMMERCIAL

## 2025-08-02 VITALS
TEMPERATURE: 97.7 F | RESPIRATION RATE: 16 BRPM | SYSTOLIC BLOOD PRESSURE: 143 MMHG | DIASTOLIC BLOOD PRESSURE: 84 MMHG | HEART RATE: 98 BPM | OXYGEN SATURATION: 97 %

## 2025-08-02 DIAGNOSIS — J45.901 EXACERBATION OF ASTHMA, UNSPECIFIED ASTHMA SEVERITY, UNSPECIFIED WHETHER PERSISTENT (HHS-HCC): ICD-10-CM

## 2025-08-02 DIAGNOSIS — J06.9 UPPER RESPIRATORY TRACT INFECTION, UNSPECIFIED TYPE: Primary | ICD-10-CM

## 2025-08-02 LAB
FLUAV RNA RESP QL NAA+PROBE: NOT DETECTED
FLUBV RNA RESP QL NAA+PROBE: NOT DETECTED
PREGNANCY TEST URINE, POC: NEGATIVE
SARS-COV-2 RNA RESP QL NAA+PROBE: NOT DETECTED

## 2025-08-02 PROCEDURE — 99284 EMERGENCY DEPT VISIT MOD MDM: CPT

## 2025-08-02 PROCEDURE — 71046 X-RAY EXAM CHEST 2 VIEWS: CPT

## 2025-08-02 PROCEDURE — 94640 AIRWAY INHALATION TREATMENT: CPT

## 2025-08-02 PROCEDURE — 99285 EMERGENCY DEPT VISIT HI MDM: CPT | Mod: 25

## 2025-08-02 PROCEDURE — 93010 ELECTROCARDIOGRAM REPORT: CPT

## 2025-08-02 PROCEDURE — 81025 URINE PREGNANCY TEST: CPT

## 2025-08-02 PROCEDURE — 2500000001 HC RX 250 WO HCPCS SELF ADMINISTERED DRUGS (ALT 637 FOR MEDICARE OP): Mod: SE

## 2025-08-02 PROCEDURE — 2500000004 HC RX 250 GENERAL PHARMACY W/ HCPCS (ALT 636 FOR OP/ED): Mod: SE

## 2025-08-02 PROCEDURE — 87636 SARSCOV2 & INF A&B AMP PRB: CPT

## 2025-08-02 PROCEDURE — 2500000002 HC RX 250 W HCPCS SELF ADMINISTERED DRUGS (ALT 637 FOR MEDICARE OP, ALT 636 FOR OP/ED): Mod: JZ,SE

## 2025-08-02 PROCEDURE — 71046 X-RAY EXAM CHEST 2 VIEWS: CPT | Performed by: STUDENT IN AN ORGANIZED HEALTH CARE EDUCATION/TRAINING PROGRAM

## 2025-08-02 PROCEDURE — 93005 ELECTROCARDIOGRAM TRACING: CPT

## 2025-08-02 RX ORDER — ALBUTEROL SULFATE 90 UG/1
2 INHALANT RESPIRATORY (INHALATION) EVERY 4 HOURS PRN
Qty: 18 G | Refills: 0 | Status: SHIPPED | OUTPATIENT
Start: 2025-08-02 | End: 2025-09-01

## 2025-08-02 RX ORDER — METHYLPREDNISOLONE 4 MG/1
TABLET ORAL
Qty: 21 TABLET | Refills: 0 | Status: SHIPPED | OUTPATIENT
Start: 2025-08-02 | End: 2025-08-08

## 2025-08-02 RX ORDER — ACETAMINOPHEN 325 MG/1
650 TABLET ORAL EVERY 6 HOURS PRN
Qty: 30 TABLET | Refills: 0 | Status: SHIPPED | OUTPATIENT
Start: 2025-08-02 | End: 2025-08-09

## 2025-08-02 RX ORDER — DEXAMETHASONE 6 MG/1
6 TABLET ORAL ONCE
Status: COMPLETED | OUTPATIENT
Start: 2025-08-02 | End: 2025-08-02

## 2025-08-02 RX ORDER — IPRATROPIUM BROMIDE AND ALBUTEROL SULFATE 2.5; .5 MG/3ML; MG/3ML
3 SOLUTION RESPIRATORY (INHALATION) ONCE
Status: COMPLETED | OUTPATIENT
Start: 2025-08-02 | End: 2025-08-02

## 2025-08-02 RX ORDER — IBUPROFEN 400 MG/1
400 TABLET, FILM COATED ORAL EVERY 6 HOURS
Qty: 28 TABLET | Refills: 0 | Status: SHIPPED | OUTPATIENT
Start: 2025-08-02 | End: 2025-08-09

## 2025-08-02 RX ORDER — ACETAMINOPHEN 325 MG/1
650 TABLET ORAL ONCE
Status: COMPLETED | OUTPATIENT
Start: 2025-08-02 | End: 2025-08-02

## 2025-08-02 RX ADMIN — ACETAMINOPHEN 650 MG: 325 TABLET ORAL at 10:43

## 2025-08-02 RX ADMIN — DEXAMETHASONE 6 MG: 6 TABLET ORAL at 10:43

## 2025-08-02 RX ADMIN — IPRATROPIUM BROMIDE AND ALBUTEROL SULFATE 3 ML: .5; 3 SOLUTION RESPIRATORY (INHALATION) at 10:43

## 2025-08-02 NOTE — ED PROVIDER NOTES
HPI   Chief Complaint   Patient presents with    URI       24-year-old female [facility] RAD presents emergency department chief complaint of flulike symptoms starting yesterday.  Patient reports that he started having a sore throat, dry cough starting yesterday as well as nasal congestion.  Patient reports that her cough is now much more increased.  Denies anything coming up with it but states that she is coughing a lot more now.  States that she also has some chest pain which happens whenever she coughs.  Chest pain is worsened with inspiration.      Denies fever, chills, recent sick contacts, heart history, previous cardiac procedures, vomiting, abdominal pain, vaginal bleeding, chance of pregnancy.              Patient History   Medical History[1]  Surgical History[2]  Family History[3]  Social History[4]    Physical Exam   ED Triage Vitals [08/02/25 0947]   Temperature Heart Rate Respirations BP   36.5 °C (97.7 °F) 98 16 143/84      Pulse Ox Temp Source Heart Rate Source Patient Position   97 % Temporal -- --      BP Location FiO2 (%)     -- --       Physical Exam  Vitals and nursing note reviewed.   Constitutional:       General: She is not in acute distress.     Appearance: Normal appearance. She is normal weight. She is not ill-appearing or toxic-appearing.   HENT:      Head: Normocephalic and atraumatic.      Mouth/Throat:      Mouth: Mucous membranes are moist.      Pharynx: Oropharynx is clear. No oropharyngeal exudate or posterior oropharyngeal erythema.     Eyes:      General:         Right eye: No discharge.         Left eye: No discharge.      Extraocular Movements: Extraocular movements intact.      Conjunctiva/sclera: Conjunctivae normal.      Pupils: Pupils are equal, round, and reactive to light.       Cardiovascular:      Rate and Rhythm: Normal rate and regular rhythm.      Heart sounds: Normal heart sounds. No murmur heard.     No friction rub. No gallop.   Pulmonary:      Effort: Pulmonary effort is  normal. No respiratory distress.      Breath sounds: No stridor. Wheezing present. No rhonchi or rales.      Comments: Mild expiratory wheezes    Neurological:      Mental Status: She is alert.           ED Course & MDM   Diagnoses as of 08/02/25 1228   Upper respiratory tract infection, unspecified type   Exacerbation of asthma, unspecified asthma severity, unspecified whether persistent ([facility])                 No data recorded                                 Medical Decision Making  22-year-old female presents emergency department chief complaints of flulike symptoms, shortness of breath starting yesterday.  Patient does not endorse any recent sick contacts but states started experiencing sore throat, dry cough, nasal congestion.  States that she has shortness of breath and has chest pain worsened with cough.  Stable vital signs here in the ED.  Dry cough throughout exam today.  Will obtain chest x-ray as well as medicate for likely asthma exacerbation component.  Will obtain viral swabs as well.    ED ACS score of -2.    Patient was given breathing treatment as well as dose of steroids here in the ED.  Viral swabs completed here were negative.  Urine pregnancy test negative.  Chest x-ray also unremarkable.  Patient was reevaluated and did report significant symptomatic improvement, states that they feel much better at this time.  Prior to chest x-ray resulting patient informed nursing staff that they were no longer able to wait here in the emergency department that they had to go.  Did advise patient that we would send and refill of albuterol inhaler as well as short course of steroids to pharmacy for symptomatic relief.    Patient likely experiencing viral URI at this time with secondary asthma flareup.  Given patient's significant symptomatic improvement here in the ED overall low suspicion for emergent pathology.  Remained well-appearing throughout their stay in the ED.  Patient stated that they had to go.   Marked as left with treatment incomplete.          Procedure  Procedures       [1]   Past Medical History:  Diagnosis Date    Asthma     Dysmenorrhea, unspecified 08/15/2022    Menstrual cramps    Encounter for immunization 09/15/2020    Immunization due    Encounter for immunization 2017    Immunization due    Encounter for issue of repeat prescription 08/15/2022    Medication refill    Encounter for screening for disorder due to exposure to contaminants     Screening for lead exposure    ROXANA (obstructive sleep apnea)     waiting on cpap   [2] History reviewed. No pertinent surgical history.  [3]   Family History  Problem Relation Name Age of Onset    Diabetes Mother Kashif Morris             Stroke Mother Kashif Morris     Other (amputation) Mother Kashif Morris    [4]   Social History  Tobacco Use    Smoking status: Some Days     Current packs/day: 0.25     Average packs/day: 0.3 packs/day for 5.1 years (1.3 ttl pk-yrs)     Types: Cigars, Cigarettes     Start date: 2020    Smokeless tobacco: Never   Vaping Use    Vaping status: Never Used   Substance Use Topics    Alcohol use: Not Currently     Alcohol/week: 2.0 standard drinks of alcohol     Types: 1 Glasses of wine, 1 Shots of liquor per week     Comment: socially    Drug use: Not Currently     Types: Marijuana        Ja Greenfield PA-C  [phone]

## 2025-08-05 LAB
ATRIAL RATE: 106 BPM
P AXIS: 81 DEGREES
P OFFSET: 204 MS
P ONSET: 160 MS
PR INTERVAL: 132 MS
Q ONSET: 226 MS
QRS COUNT: 17 BEATS
QRS DURATION: 74 MS
QT INTERVAL: 328 MS
QTC CALCULATION(BAZETT): 435 MS
QTC FREDERICIA: 396 MS
R AXIS: 44 DEGREES
T AXIS: 45 DEGREES
T OFFSET: 390 MS
VENTRICULAR RATE: 106 BPM

## 2025-08-06 ENCOUNTER — OFFICE VISIT (OUTPATIENT)
Dept: PRIMARY CARE | Facility: CLINIC | Age: 25
End: 2025-08-06
Payer: COMMERCIAL

## 2025-08-06 VITALS
OXYGEN SATURATION: 98 % | HEART RATE: 78 BPM | WEIGHT: 293 LBS | SYSTOLIC BLOOD PRESSURE: 123 MMHG | BODY MASS INDEX: 48.82 KG/M2 | RESPIRATION RATE: 16 BRPM | TEMPERATURE: 97.7 F | DIASTOLIC BLOOD PRESSURE: 77 MMHG | HEIGHT: 65 IN

## 2025-08-06 DIAGNOSIS — M54.50 CHRONIC BILATERAL LOW BACK PAIN WITHOUT SCIATICA: ICD-10-CM

## 2025-08-06 DIAGNOSIS — E66.01 SEVERE OBESITY (BMI >= 40) (MULTI): ICD-10-CM

## 2025-08-06 DIAGNOSIS — Z00.00 HEALTH MAINTENANCE EXAMINATION: Primary | ICD-10-CM

## 2025-08-06 DIAGNOSIS — G89.29 CHRONIC BILATERAL LOW BACK PAIN WITHOUT SCIATICA: ICD-10-CM

## 2025-08-06 DIAGNOSIS — L70.8 OTHER ACNE: ICD-10-CM

## 2025-08-06 DIAGNOSIS — Z11.3 SCREENING EXAMINATION FOR STD (SEXUALLY TRANSMITTED DISEASE): ICD-10-CM

## 2025-08-06 DIAGNOSIS — G47.33 OSA (OBSTRUCTIVE SLEEP APNEA): ICD-10-CM

## 2025-08-06 DIAGNOSIS — B35.1 ONYCHOMYCOSIS: ICD-10-CM

## 2025-08-06 LAB — PREGNANCY TEST URINE, POC: NEGATIVE

## 2025-08-06 PROCEDURE — 99395 PREV VISIT EST AGE 18-39: CPT | Performed by: FAMILY MEDICINE

## 2025-08-06 PROCEDURE — 3008F BODY MASS INDEX DOCD: CPT | Performed by: FAMILY MEDICINE

## 2025-08-06 PROCEDURE — 99214 OFFICE O/P EST MOD 30 MIN: CPT | Performed by: FAMILY MEDICINE

## 2025-08-06 PROCEDURE — 81025 URINE PREGNANCY TEST: CPT | Performed by: FAMILY MEDICINE

## 2025-08-06 PROCEDURE — 36415 COLL VENOUS BLD VENIPUNCTURE: CPT | Performed by: FAMILY MEDICINE

## 2025-08-06 RX ORDER — LIDOCAINE 50 MG/G
1 PATCH TOPICAL DAILY
Qty: 30 PATCH | Refills: 2 | Status: SHIPPED | OUTPATIENT
Start: 2025-08-06 | End: 2026-08-06

## 2025-08-06 RX ORDER — BENZOYL PEROXIDE 50 MG/ML
LIQUID TOPICAL EVERY 12 HOURS
Qty: 227 G | Refills: 3 | Status: SHIPPED | OUTPATIENT
Start: 2025-08-06

## 2025-08-06 RX ORDER — MUPIROCIN 20 MG/G
OINTMENT TOPICAL 2 TIMES DAILY
Qty: 30 G | Refills: 2 | Status: SHIPPED | OUTPATIENT
Start: 2025-08-06

## 2025-08-06 ASSESSMENT — PAIN SCALES - GENERAL: PAINLEVEL_OUTOF10: 5

## 2025-08-06 NOTE — PROGRESS NOTES
"Subjective   Patient ID: Luz Elena Morris is a 24 y.o. female who presents for Annual Exam.    HPI     Low back pain  - Would like Lidocaine patch, went to PT last time  - gotten worse since last visit  - today mostly sore, constant pain localized to lower back  - random occurrences of tingling, obscure numbness on anterior legs.      ROXANA  - Patient does have CPAP machine, has been inconsistent with use - has not been able to use it for 21 consecutive days as required by insurance for full coverage  - Otherwise very pleased with results when she does use the machine    Toenail concern  -  Diagnosed with tinea unguium per podiatry; was given topical penlac with no resolution  -  Patient would like referral to podiatrist closer to home    Arm/wrist discomfort  - For the past couple weeks, patient reports a pin and needles-type sensation in left arm; yesterday she also had sudden atonia of left arm which caused her to drop something she was holding  _ In her right hand, she's also been recently experiencing muscles spasms and feeling of fingers being locked in place    STD Concern  - Patient recently had unprotected sex last week; would like to have full STD panel and pregnancy test today  - Patient currently not on BC    Hidradenitis/Acne  - Patient reports history of acne which greatly improved with benzoyl and mupirocin, would like a refill      10 point review of systems negative except as noted in HPI.      Objective   /77   Pulse 78   Temp 36.5 °C (97.7 °F)   Resp 16   Ht 1.66 m (5' 5.35\")   Wt 143 kg (315 lb)   SpO2 98%   BMI 51.86 kg/m²     General: well appearing, no distress  CV: Regular rate and rhythm, no murmur  Lungs: Clear to auscultation bilaterally  Abdomen: Soft, nontender, nondistended  Extremities: No edema noted  Psych: Appropriate mood and affect  MSK: Lumbar spine and paraspinal muscles tender to palpation. No deformity or drop-off noted. Negative straight leg raise  Feet: " Oncychomycosis noted       Assessment/Plan     Luz Elena Morris is a 25 yo F presenting for follow-up and health maintenance exam.    Chronic bilateral low back pain without sciatica  - Given chronic nature of pain and limited improvement with PT, discussed with patient next step in management would be meeting with pain medicine to explore further treatment options; patient agreeable to plan  -     lidocaine (Lidoderm) 5 % patch  -     Referral to Pain Medicine; Future  Screening examination for STD (sexually transmitted disease)  -     HIV 1/2 Antigen/Antibody Screen with Reflex to Confirmation  -     Syphilis Screen with Reflex  -     C. trachomatis / N. gonorrhoeae, Amplified, Urogenital  -     POCT pregnancy, urine manually resulted  - Additionally discussed birth control options. She declines birth control, would be fine if she got pregnant  Severe obesity (BMI >= 40) (Multi)  - Discussed possible Zepbound in the future, which would help patient with ROXANA and weight management - patient agreeable, will reassess after lab results  Health maintenance examination  -     Comprehensive Metabolic Panel  -     Lipid Panel  -     Hemoglobin A1C  - Recommended Pap, patient will schedule  Onychomycosis  - Discussed next step of management includes systemic, oral antifungal therapy  -     LFTs to evaluate liver function before starting  -     Referral to Podiatry; Future  ROXANA (obstructive sleep apnea)  -     Referral to Adult Sleep Medicine; Future  Arm/wrist discomfort        -  Suspect early symptoms of carpal tunnel syndrome        -  Patient agreed to observe for now, will reassess at next visit in a month.     RTC 1 month. Plan to discuss lab results, perform routine pap smear, and reassess possible CTS at that time.     Seen and discussed with Dr. Noe, attending physician.     KAYLA BHARDWAJ MS3    Patient was seen and examined by myself in conjunction with medical student. I have edited note above. Georgi Noe

## 2025-08-08 DIAGNOSIS — B35.1 ONYCHOMYCOSIS: ICD-10-CM

## 2025-08-08 DIAGNOSIS — E66.01 SEVERE OBESITY (BMI >= 40) (MULTI): Primary | ICD-10-CM

## 2025-08-08 DIAGNOSIS — G47.33 OSA (OBSTRUCTIVE SLEEP APNEA): ICD-10-CM

## 2025-08-08 RX ORDER — TERBINAFINE HYDROCHLORIDE 250 MG/1
250 TABLET ORAL DAILY
Qty: 84 TABLET | Refills: 0 | Status: SHIPPED | OUTPATIENT
Start: 2025-08-08 | End: 2025-10-31

## 2025-08-10 LAB
ALBUMIN SERPL-MCNC: 4.4 G/DL (ref 3.6–5.1)
ALP SERPL-CCNC: 98 U/L (ref 31–125)
ALT SERPL-CCNC: 11 U/L (ref 6–29)
ANION GAP SERPL CALCULATED.4IONS-SCNC: 10 MMOL/L (CALC) (ref 7–17)
AST SERPL-CCNC: 13 U/L (ref 10–30)
BILIRUB SERPL-MCNC: 0.4 MG/DL (ref 0.2–1.2)
BUN SERPL-MCNC: 10 MG/DL (ref 7–25)
C TRACH RRNA SPEC QL NAA+PROBE: NOT DETECTED
CALCIUM SERPL-MCNC: 9.1 MG/DL (ref 8.6–10.2)
CHLORIDE SERPL-SCNC: 103 MMOL/L (ref 98–110)
CHOLEST SERPL-MCNC: 155 MG/DL
CHOLEST/HDLC SERPL: 3.9 (CALC)
CO2 SERPL-SCNC: 22 MMOL/L (ref 20–32)
CREAT SERPL-MCNC: 0.62 MG/DL (ref 0.5–0.96)
EGFRCR SERPLBLD CKD-EPI 2021: 129 ML/MIN/1.73M2
EST. AVERAGE GLUCOSE BLD GHB EST-MCNC: 108 MG/DL
EST. AVERAGE GLUCOSE BLD GHB EST-SCNC: 6 MMOL/L
GLUCOSE SERPL-MCNC: 106 MG/DL (ref 65–99)
HBA1C MFR BLD: 5.4 %
HDLC SERPL-MCNC: 40 MG/DL
HIV 1+2 AB+HIV1 P24 AG SERPL QL IA: NORMAL
HIV 1+2 AB+HIV1 P24 AG SERPL QL IA: NORMAL
LDLC SERPL CALC-MCNC: 94 MG/DL (CALC)
N GONORRHOEA RRNA SPEC QL NAA+PROBE: NOT DETECTED
NONHDLC SERPL-MCNC: 115 MG/DL (CALC)
POTASSIUM SERPL-SCNC: 4.5 MMOL/L (ref 3.5–5.3)
PROT SERPL-MCNC: 7.4 G/DL (ref 6.1–8.1)
QUEST GC CT AMPLIFIED (ALWAYS MESSAGE): NORMAL
SODIUM SERPL-SCNC: 135 MMOL/L (ref 135–146)
T PALLIDUM AB SER QL IA: NEGATIVE
TRIGL SERPL-MCNC: 118 MG/DL

## 2025-08-15 ENCOUNTER — APPOINTMENT (OUTPATIENT)
Dept: PODIATRY | Facility: CLINIC | Age: 25
End: 2025-08-15
Payer: COMMERCIAL

## 2025-08-19 ENCOUNTER — APPOINTMENT (OUTPATIENT)
Dept: PODIATRY | Facility: CLINIC | Age: 25
End: 2025-08-19
Payer: COMMERCIAL

## 2025-08-19 DIAGNOSIS — M79.674 TOE PAIN, RIGHT: ICD-10-CM

## 2025-08-19 DIAGNOSIS — M79.675 TOE PAIN, LEFT: ICD-10-CM

## 2025-08-19 DIAGNOSIS — B35.1 ONYCHOMYCOSIS: Primary | ICD-10-CM

## 2025-08-19 DIAGNOSIS — L60.2 ONYCHOGRYPHOSIS: ICD-10-CM

## 2025-08-19 PROCEDURE — 99214 OFFICE O/P EST MOD 30 MIN: CPT | Performed by: PODIATRIST
